# Patient Record
Sex: FEMALE | Race: BLACK OR AFRICAN AMERICAN | NOT HISPANIC OR LATINO
[De-identification: names, ages, dates, MRNs, and addresses within clinical notes are randomized per-mention and may not be internally consistent; named-entity substitution may affect disease eponyms.]

---

## 2017-01-05 ENCOUNTER — APPOINTMENT (OUTPATIENT)
Dept: OBGYN | Facility: CLINIC | Age: 37
End: 2017-01-05

## 2017-04-17 ENCOUNTER — APPOINTMENT (OUTPATIENT)
Dept: PLASTIC SURGERY | Facility: CLINIC | Age: 37
End: 2017-04-17

## 2017-06-29 ENCOUNTER — APPOINTMENT (OUTPATIENT)
Dept: OBGYN | Facility: CLINIC | Age: 37
End: 2017-06-29

## 2017-06-29 VITALS
HEIGHT: 65 IN | DIASTOLIC BLOOD PRESSURE: 80 MMHG | HEART RATE: 89 BPM | WEIGHT: 163 LBS | BODY MASS INDEX: 27.16 KG/M2 | SYSTOLIC BLOOD PRESSURE: 120 MMHG

## 2017-07-03 ENCOUNTER — APPOINTMENT (OUTPATIENT)
Dept: PLASTIC SURGERY | Facility: CLINIC | Age: 37
End: 2017-07-03

## 2017-07-16 ENCOUNTER — EMERGENCY (EMERGENCY)
Facility: HOSPITAL | Age: 37
LOS: 1 days | Discharge: HOME | End: 2017-07-16
Attending: PERSONAL EMERGENCY RESPONSE ATTENDANT | Admitting: PERSONAL EMERGENCY RESPONSE ATTENDANT
Payer: COMMERCIAL

## 2017-07-16 VITALS
SYSTOLIC BLOOD PRESSURE: 127 MMHG | OXYGEN SATURATION: 98 % | HEART RATE: 66 BPM | DIASTOLIC BLOOD PRESSURE: 86 MMHG | TEMPERATURE: 99 F | RESPIRATION RATE: 16 BRPM

## 2017-07-16 VITALS
SYSTOLIC BLOOD PRESSURE: 136 MMHG | RESPIRATION RATE: 17 BRPM | TEMPERATURE: 98 F | OXYGEN SATURATION: 100 % | DIASTOLIC BLOOD PRESSURE: 65 MMHG | HEART RATE: 61 BPM

## 2017-07-16 LAB
ALBUMIN SERPL ELPH-MCNC: 4.2 G/DL — SIGNIFICANT CHANGE UP (ref 3.3–5)
ALP SERPL-CCNC: 73 U/L — SIGNIFICANT CHANGE UP (ref 40–120)
ALT FLD-CCNC: 15 U/L RC — SIGNIFICANT CHANGE UP (ref 10–45)
ANION GAP SERPL CALC-SCNC: 15 MMOL/L — SIGNIFICANT CHANGE UP (ref 5–17)
APPEARANCE UR: ABNORMAL
AST SERPL-CCNC: 17 U/L — SIGNIFICANT CHANGE UP (ref 10–40)
BASOPHILS # BLD AUTO: 0 K/UL — SIGNIFICANT CHANGE UP (ref 0–0.2)
BASOPHILS NFR BLD AUTO: 0.8 % — SIGNIFICANT CHANGE UP (ref 0–2)
BILIRUB SERPL-MCNC: 0.3 MG/DL — SIGNIFICANT CHANGE UP (ref 0.2–1.2)
BILIRUB UR-MCNC: NEGATIVE — SIGNIFICANT CHANGE UP
BUN SERPL-MCNC: 7 MG/DL — SIGNIFICANT CHANGE UP (ref 7–23)
CALCIUM SERPL-MCNC: 9.8 MG/DL — SIGNIFICANT CHANGE UP (ref 8.4–10.5)
CHLORIDE SERPL-SCNC: 102 MMOL/L — SIGNIFICANT CHANGE UP (ref 96–108)
CO2 SERPL-SCNC: 23 MMOL/L — SIGNIFICANT CHANGE UP (ref 22–31)
COLOR SPEC: YELLOW — SIGNIFICANT CHANGE UP
COMMENT - URINE: SIGNIFICANT CHANGE UP
CREAT SERPL-MCNC: 0.81 MG/DL — SIGNIFICANT CHANGE UP (ref 0.5–1.3)
DIFF PNL FLD: NEGATIVE — SIGNIFICANT CHANGE UP
EOSINOPHIL # BLD AUTO: 0.1 K/UL — SIGNIFICANT CHANGE UP (ref 0–0.5)
EOSINOPHIL NFR BLD AUTO: 2 % — SIGNIFICANT CHANGE UP (ref 0–6)
EPI CELLS # UR: SIGNIFICANT CHANGE UP /HPF
GLUCOSE SERPL-MCNC: 71 MG/DL — SIGNIFICANT CHANGE UP (ref 70–99)
GLUCOSE UR QL: NEGATIVE — SIGNIFICANT CHANGE UP
HCT VFR BLD CALC: 42.3 % — SIGNIFICANT CHANGE UP (ref 34.5–45)
HGB BLD-MCNC: 13.8 G/DL — SIGNIFICANT CHANGE UP (ref 11.5–15.5)
KETONES UR-MCNC: NEGATIVE — SIGNIFICANT CHANGE UP
LEUKOCYTE ESTERASE UR-ACNC: NEGATIVE — SIGNIFICANT CHANGE UP
LYMPHOCYTES # BLD AUTO: 2.2 K/UL — SIGNIFICANT CHANGE UP (ref 1–3.3)
LYMPHOCYTES # BLD AUTO: 38.5 % — SIGNIFICANT CHANGE UP (ref 13–44)
MCHC RBC-ENTMCNC: 29.2 PG — SIGNIFICANT CHANGE UP (ref 27–34)
MCHC RBC-ENTMCNC: 32.7 GM/DL — SIGNIFICANT CHANGE UP (ref 32–36)
MCV RBC AUTO: 89.5 FL — SIGNIFICANT CHANGE UP (ref 80–100)
MONOCYTES # BLD AUTO: 0.5 K/UL — SIGNIFICANT CHANGE UP (ref 0–0.9)
MONOCYTES NFR BLD AUTO: 8.6 % — SIGNIFICANT CHANGE UP (ref 2–14)
NEUTROPHILS # BLD AUTO: 2.9 K/UL — SIGNIFICANT CHANGE UP (ref 1.8–7.4)
NEUTROPHILS NFR BLD AUTO: 50 % — SIGNIFICANT CHANGE UP (ref 43–77)
NITRITE UR-MCNC: NEGATIVE — SIGNIFICANT CHANGE UP
PH UR: 7.5 — SIGNIFICANT CHANGE UP (ref 5–8)
PLATELET # BLD AUTO: 255 K/UL — SIGNIFICANT CHANGE UP (ref 150–400)
POTASSIUM SERPL-MCNC: 4.1 MMOL/L — SIGNIFICANT CHANGE UP (ref 3.5–5.3)
POTASSIUM SERPL-SCNC: 4.1 MMOL/L — SIGNIFICANT CHANGE UP (ref 3.5–5.3)
PROT SERPL-MCNC: 9.2 G/DL — HIGH (ref 6–8.3)
PROT UR-MCNC: SIGNIFICANT CHANGE UP
RBC # BLD: 4.72 M/UL — SIGNIFICANT CHANGE UP (ref 3.8–5.2)
RBC # FLD: 12.3 % — SIGNIFICANT CHANGE UP (ref 10.3–14.5)
SODIUM SERPL-SCNC: 140 MMOL/L — SIGNIFICANT CHANGE UP (ref 135–145)
SP GR SPEC: 1.02 — SIGNIFICANT CHANGE UP (ref 1.01–1.02)
TSH SERPL-MCNC: 1.08 UIU/ML — SIGNIFICANT CHANGE UP (ref 0.27–4.2)
UROBILINOGEN FLD QL: NEGATIVE — SIGNIFICANT CHANGE UP
WBC # BLD: 5.8 K/UL — SIGNIFICANT CHANGE UP (ref 3.8–10.5)
WBC # FLD AUTO: 5.8 K/UL — SIGNIFICANT CHANGE UP (ref 3.8–10.5)

## 2017-07-16 PROCEDURE — 84443 ASSAY THYROID STIM HORMONE: CPT

## 2017-07-16 PROCEDURE — 85027 COMPLETE CBC AUTOMATED: CPT

## 2017-07-16 PROCEDURE — 93005 ELECTROCARDIOGRAM TRACING: CPT

## 2017-07-16 PROCEDURE — 80053 COMPREHEN METABOLIC PANEL: CPT

## 2017-07-16 PROCEDURE — 87086 URINE CULTURE/COLONY COUNT: CPT

## 2017-07-16 PROCEDURE — 99284 EMERGENCY DEPT VISIT MOD MDM: CPT | Mod: 25

## 2017-07-16 PROCEDURE — 93010 ELECTROCARDIOGRAM REPORT: CPT

## 2017-07-16 PROCEDURE — 81001 URINALYSIS AUTO W/SCOPE: CPT

## 2017-07-16 RX ORDER — SODIUM CHLORIDE 9 MG/ML
1000 INJECTION INTRAMUSCULAR; INTRAVENOUS; SUBCUTANEOUS ONCE
Qty: 0 | Refills: 0 | Status: COMPLETED | OUTPATIENT
Start: 2017-07-16 | End: 2017-07-16

## 2017-07-16 RX ORDER — DIPHENHYDRAMINE HCL 50 MG
25 CAPSULE ORAL EVERY 4 HOURS
Qty: 0 | Refills: 0 | Status: DISCONTINUED | OUTPATIENT
Start: 2017-07-16 | End: 2017-07-20

## 2017-07-16 RX ADMIN — Medication 60 MILLIGRAM(S): at 13:55

## 2017-07-16 RX ADMIN — Medication 25 MILLIGRAM(S): at 11:57

## 2017-07-16 RX ADMIN — SODIUM CHLORIDE 1000 MILLILITER(S): 9 INJECTION INTRAMUSCULAR; INTRAVENOUS; SUBCUTANEOUS at 11:23

## 2017-07-16 NOTE — ED PROVIDER NOTE - PROGRESS NOTE DETAILS
[employer]:  Pt feels improved s/p 1L NS bolus.  Labs and urine non-actionable.  No focal neuro deficits.  Pt requests tx for pruritic rash.  Rash is non-specific.  No focal findings for rash.  NO excoriations/erythema concerning for superinfection.  Advised she will be rxed short course of steroids for tx of unclear etiology of rash.  Referred to rheumatology for further assessment.  PT advised that she should follow-up with rheumatology and return to ED for any acutely new/worsening sxs.  Return to ED immediately for CP/acute SOB/syncope or pre-syncope. [employer]:  Pt advised of EKG abnormality.  No syncopal events.  No arrythmia.  Does not meet brugada criteria and pt ahs no CP.  Referred to cardiology for further eval.  TSH has resulted and is wnl.  Referred to PCP/cards/rheum.  Short course steroids.  Given copy of EKG. Stable for discharge.

## 2017-07-16 NOTE — ED ADULT NURSE NOTE - OBJECTIVE STATEMENT
38 yo  F presents to ED A+Ox3 c/o allergic reaction. States she saw her PMD approx. 3 weeks ago after feeling dizzy. States she was instructed by her PMD to begin losing weight, pt. started drinking apple cider vinegar 3 weeks ago every morning. Reports after three days of drinking the apple cider, she began to notice a "rash." States she stopped using the apple cider on Friday. Small raised areas noted to abdomen, legs and arms. States she put lavender oil to the right arm "on one of the spots and it got bigger." No drainage noted to raised areas. Pt. denies SOB, difficulty breathing, throat itchiness, fever, chills, N/V, abdominal, chest pain. Lungs CTA, breathing unlabored on RA. Skin warm dry and of color appropriate for ethnicity. Abdomen soft, nondistended, nontender. Comfort and safety maintained. 36 yo  F presents to ED A+Ox3 c/o allergic reaction. States she saw her PMD approx. 3 weeks ago after feeling dizzy. States she was instructed by her PMD to begin losing weight, pt. started drinking apple cider vinegar 3 weeks ago every morning. Reports after three days of drinking the apple cider, she began to notice a "rash." States she stopped using the apple cider on Friday. Small raised areas noted to abdomen, legs and arms. States she put lavender oil to the right arm "on one of the spots and it got bigger." No drainage noted to raised areas. Pt. denies SOB, difficulty breathing, throat itchiness, fever, chills, N/V, abdominal, chest pain. Lungs CTA, breathing unlabored on RA. Skin warm dry and of color appropriate for ethnicity. Abdomen soft, nondistended, nontender. Comfort and safety maintained. Denies any recent changes in soap, detergent.

## 2017-07-16 NOTE — ED PROVIDER NOTE - OBJECTIVE STATEMENT
37 year old female presents c/o body itching x 2 weeks. Patient states that she began noticing bumps on her skin associated with pruritis 2 days after starting to drink apple cider vinegar. Patient was seen by PMD prior to starting apple cider vinegar w/ complaints of dizziness and feeling light headed, PMD stated that pt had elevated cholesterol and patient was advised to lose weight which is why she began to take the apple cider vinegar. Patient stopped taking ACV 2 days ago, she denies any new changes in food, lotions, detergents. She states that she has noticed some SHARMA for the past few weeks. She denies sensation of her throat closing, difficulty breathing, chest pain. abdominal pain, N/V, fevers.       PMD: Dr. Robert Smith

## 2017-07-17 LAB
CULTURE RESULTS: SIGNIFICANT CHANGE UP
SPECIMEN SOURCE: SIGNIFICANT CHANGE UP

## 2017-07-31 ENCOUNTER — APPOINTMENT (OUTPATIENT)
Dept: PLASTIC SURGERY | Facility: CLINIC | Age: 37
End: 2017-07-31
Payer: MEDICAID

## 2017-07-31 DIAGNOSIS — N62 HYPERTROPHY OF BREAST: ICD-10-CM

## 2017-07-31 PROCEDURE — 99213 OFFICE O/P EST LOW 20 MIN: CPT

## 2017-08-15 ENCOUNTER — TRANSCRIPTION ENCOUNTER (OUTPATIENT)
Age: 37
End: 2017-08-15

## 2018-10-18 ENCOUNTER — APPOINTMENT (OUTPATIENT)
Dept: OBGYN | Facility: CLINIC | Age: 38
End: 2018-10-18
Payer: COMMERCIAL

## 2018-10-18 VITALS
BODY MASS INDEX: 26.52 KG/M2 | DIASTOLIC BLOOD PRESSURE: 82 MMHG | SYSTOLIC BLOOD PRESSURE: 136 MMHG | HEART RATE: 69 BPM | WEIGHT: 159.2 LBS | HEIGHT: 65 IN

## 2018-10-18 PROCEDURE — 99395 PREV VISIT EST AGE 18-39: CPT

## 2018-10-31 LAB
CYTOLOGY CVX/VAG DOC THIN PREP: NORMAL
HBV SURFACE AG SER QL: NONREACTIVE
HCV AB SER QL: NONREACTIVE
HCV S/CO RATIO: 0.12 S/CO
HIV1+2 AB SPEC QL IA.RAPID: NONREACTIVE
HPV HIGH+LOW RISK DNA PNL CVX: NOT DETECTED
T PALLIDUM AB SER QL IA: NEGATIVE

## 2019-06-04 ENCOUNTER — MED ADMIN CHARGE (OUTPATIENT)
Age: 39
End: 2019-06-04

## 2019-06-04 DIAGNOSIS — Z86.19 PERSONAL HISTORY OF OTHER INFECTIOUS AND PARASITIC DISEASES: ICD-10-CM

## 2019-11-08 ENCOUNTER — APPOINTMENT (OUTPATIENT)
Dept: OBGYN | Facility: CLINIC | Age: 39
End: 2019-11-08

## 2020-01-02 ENCOUNTER — APPOINTMENT (OUTPATIENT)
Dept: OBGYN | Facility: CLINIC | Age: 40
End: 2020-01-02
Payer: COMMERCIAL

## 2020-01-02 VITALS
WEIGHT: 156.44 LBS | BODY MASS INDEX: 26.06 KG/M2 | HEART RATE: 71 BPM | SYSTOLIC BLOOD PRESSURE: 134 MMHG | DIASTOLIC BLOOD PRESSURE: 77 MMHG | HEIGHT: 65 IN

## 2020-01-02 PROCEDURE — 99395 PREV VISIT EST AGE 18-39: CPT

## 2020-01-04 LAB — HPV HIGH+LOW RISK DNA PNL CVX: NOT DETECTED

## 2020-01-04 NOTE — HISTORY OF PRESENT ILLNESS
[Good] : being in good health [Last Pap ___] : Last cervical pap smear was [unfilled] [Regular Exercise] : not exercising regularly [Regular Cycle Intervals] : periods have been regular [Contraception] : does not use contraception [Sexually Active] : is not sexually active

## 2020-01-04 NOTE — COUNSELING
[Breast Self Exam] : breast self exam [Vitamins/Supplements] : vitamins/supplements [Exercise] : exercise [Nutrition] : nutrition [Medication Management] : medication management

## 2020-01-04 NOTE — PHYSICAL EXAM
[Awake] : awake [LAD] : no lymphadenopathy [Acute Distress] : no acute distress [Alert] : alert [Goiter] : no goiter [Thyroid Nodule] : no thyroid nodule [Mass] : no breast mass [Nipple Discharge] : no nipple discharge [Axillary LAD] : no axillary lymphadenopathy [Tender] : non tender [Oriented x3] : oriented to person, place, and time [Soft] : soft [No Bleeding] : there was no active vaginal bleeding [Uterine Adnexae] : were not tender and not enlarged [Normal] : uterus

## 2020-01-08 LAB — CYTOLOGY CVX/VAG DOC THIN PREP: ABNORMAL

## 2020-01-28 DIAGNOSIS — N92.0 EXCESSIVE AND FREQUENT MENSTRUATION WITH REGULAR CYCLE: ICD-10-CM

## 2020-01-30 ENCOUNTER — OTHER (OUTPATIENT)
Age: 40
End: 2020-01-30

## 2020-02-12 ENCOUNTER — ASOB RESULT (OUTPATIENT)
Age: 40
End: 2020-02-12

## 2020-02-12 ENCOUNTER — APPOINTMENT (OUTPATIENT)
Dept: OBGYN | Facility: CLINIC | Age: 40
End: 2020-02-12
Payer: COMMERCIAL

## 2020-02-12 DIAGNOSIS — N84.0 POLYP OF CORPUS UTERI: ICD-10-CM

## 2020-02-12 DIAGNOSIS — D25.0 SUBMUCOUS LEIOMYOMA OF UTERUS: ICD-10-CM

## 2020-02-12 LAB — HCG UR QL: NEGATIVE

## 2020-02-12 PROCEDURE — 76831 ECHO EXAM UTERUS: CPT

## 2020-02-12 PROCEDURE — ZZZZZ: CPT

## 2020-02-12 PROCEDURE — 58340 CATHETER FOR HYSTEROGRAPHY: CPT

## 2020-03-17 PROBLEM — D25.0 SUBMUCOUS LEIOMYOMA OF UTERUS: Status: ACTIVE | Noted: 2020-03-17

## 2020-03-17 PROBLEM — N84.0 ENDOMETRIAL POLYP: Status: ACTIVE | Noted: 2020-03-17

## 2020-03-17 NOTE — PROCEDURE
[Abnormal Uterine Bleeding] : abnormal uterine bleeding [FreeTextEntry3] : Saline sonogram perfomed\par SM myoma and intracavitary polypoid lesion seen.\par See offical sono report

## 2020-03-19 DIAGNOSIS — N76.0 ACUTE VAGINITIS: ICD-10-CM

## 2020-03-19 DIAGNOSIS — B96.89 ACUTE VAGINITIS: ICD-10-CM

## 2020-05-08 ENCOUNTER — APPOINTMENT (OUTPATIENT)
Dept: OBGYN | Facility: CLINIC | Age: 40
End: 2020-05-08

## 2020-07-16 ENCOUNTER — OUTPATIENT (OUTPATIENT)
Dept: OUTPATIENT SERVICES | Facility: HOSPITAL | Age: 40
LOS: 1 days | End: 2020-07-16

## 2020-07-16 VITALS
RESPIRATION RATE: 16 BRPM | HEIGHT: 65 IN | HEART RATE: 56 BPM | SYSTOLIC BLOOD PRESSURE: 122 MMHG | WEIGHT: 153 LBS | DIASTOLIC BLOOD PRESSURE: 78 MMHG | TEMPERATURE: 97 F | OXYGEN SATURATION: 95 %

## 2020-07-16 DIAGNOSIS — N84.1 POLYP OF CERVIX UTERI: ICD-10-CM

## 2020-07-16 LAB
HCG UR-SCNC: NEGATIVE — SIGNIFICANT CHANGE UP
HCT VFR BLD CALC: 40.7 % — SIGNIFICANT CHANGE UP (ref 34.5–45)
HGB BLD-MCNC: 13.3 G/DL — SIGNIFICANT CHANGE UP (ref 11.5–15.5)
MCHC RBC-ENTMCNC: 29.2 PG — SIGNIFICANT CHANGE UP (ref 27–34)
MCHC RBC-ENTMCNC: 32.7 % — SIGNIFICANT CHANGE UP (ref 32–36)
MCV RBC AUTO: 89.5 FL — SIGNIFICANT CHANGE UP (ref 80–100)
NRBC # FLD: 0 K/UL — SIGNIFICANT CHANGE UP (ref 0–0)
PLATELET # BLD AUTO: 273 K/UL — SIGNIFICANT CHANGE UP (ref 150–400)
PMV BLD: 11.9 FL — SIGNIFICANT CHANGE UP (ref 7–13)
RBC # BLD: 4.55 M/UL — SIGNIFICANT CHANGE UP (ref 3.8–5.2)
RBC # FLD: 13.1 % — SIGNIFICANT CHANGE UP (ref 10.3–14.5)
SP GR UR: 1.02 — SIGNIFICANT CHANGE UP (ref 1–1.04)
WBC # BLD: 5.34 K/UL — SIGNIFICANT CHANGE UP (ref 3.8–10.5)
WBC # FLD AUTO: 5.34 K/UL — SIGNIFICANT CHANGE UP (ref 3.8–10.5)

## 2020-07-16 NOTE — H&P PST ADULT - NEGATIVE ENMT SYMPTOMS
no dysphagia/no throat pain/no tinnitus/no vertigo/no hearing difficulty/no sinus symptoms/no nose bleeds/no ear pain

## 2020-07-16 NOTE — H&P PST ADULT - NSICDXPASTMEDICALHX_GEN_ALL_CORE_FT
PAST MEDICAL HISTORY:  Excessive and frequent menstruation with regular cycle     No pertinent past medical history     Polyp of cervix uteri PAST MEDICAL HISTORY:  Excessive and frequent menstruation with regular cycle     Polyp of cervix uteri

## 2020-07-16 NOTE — H&P PST ADULT - ATTENDING COMMENTS
Pt with AUB presents for D&C hysteroscopy and resectoscope for uterine polyp. R/S/B of procedure d/w pt including but not limited to infection, bleeding, injury to uterus and nearby organs.  Pt also gave consent for EUA by learners.

## 2020-07-16 NOTE — H&P PST ADULT - ASSESSMENT
polyp of cervix uteri  excessive and frequent menstruation with regular cycle Problem: polyp of cervix uteri, excessive and frequent menstruation with regular cycle  Assessment and Plan: Pt is tentatively scheduled for scheduled for dilation curettage hysteroscopy with symphion for 7/24/20. Pre-op instructions provided. Pt given verbal and written instructions with teach back on pepcid. Urine cup provided for day of procedure pregnancy test. Pt verbalized understanding with return demonstration.     Pt states she is scheduled for COVID-19 test on 7/22

## 2020-07-16 NOTE — H&P PST ADULT - NEGATIVE CARDIOVASCULAR SYMPTOMS
no paroxysmal nocturnal dyspnea/no palpitations/no peripheral edema/no dyspnea on exertion/no chest pain

## 2020-07-16 NOTE — H&P PST ADULT - NEGATIVE OPHTHALMOLOGIC SYMPTOMS
no pain R/no loss of vision L/no diplopia/no photophobia/no blurred vision L/no loss of vision R/no blurred vision R/no pain L

## 2020-07-16 NOTE — H&P PST ADULT - HISTORY OF PRESENT ILLNESS
polyp of cervix uteri  excessive and frequent menstruation with regular cycle 40 year old female presents to presurgical testing with diagnosis of polyp of cervix uteri and excessive and frequent menstruation with regular cycle scheduled for dilation curettage hysteroscopy with symphion. Pt complaining of changes in menses over last several months, associated with menorrhagia, dysmenorrhea, spotting between menses, and frequent menses.

## 2020-07-16 NOTE — H&P PST ADULT - NEGATIVE NEUROLOGICAL SYMPTOMS
no paresthesias/no generalized seizures/no focal seizures/no syncope/no headache/no transient paralysis/no tremors/no weakness/no difficulty walking

## 2020-07-16 NOTE — H&P PST ADULT - NSICDXPASTSURGICALHX_GEN_ALL_CORE_FT
PAST SURGICAL HISTORY:  No significant past surgical history PAST SURGICAL HISTORY:  S/P bilateral breast reduction 2/2020

## 2020-07-16 NOTE — H&P PST ADULT - NSANTHOSAYNRD_GEN_A_CORE
No. SHARMAINE screening performed.  STOP BANG Legend: 0-2 = LOW Risk; 3-4 = INTERMEDIATE Risk; 5-8 = HIGH Risk

## 2020-07-21 ENCOUNTER — APPOINTMENT (OUTPATIENT)
Dept: OBGYN | Facility: CLINIC | Age: 40
End: 2020-07-21

## 2020-07-21 LAB — SARS-COV-2 N GENE NPH QL NAA+PROBE: NOT DETECTED

## 2020-07-23 ENCOUNTER — TRANSCRIPTION ENCOUNTER (OUTPATIENT)
Age: 40
End: 2020-07-23

## 2020-07-24 ENCOUNTER — RESULT REVIEW (OUTPATIENT)
Age: 40
End: 2020-07-24

## 2020-07-24 ENCOUNTER — APPOINTMENT (OUTPATIENT)
Dept: OBGYN | Facility: HOSPITAL | Age: 40
End: 2020-07-24

## 2020-07-24 ENCOUNTER — OUTPATIENT (OUTPATIENT)
Dept: OUTPATIENT SERVICES | Facility: HOSPITAL | Age: 40
LOS: 1 days | Discharge: HOME | End: 2020-07-24
Payer: COMMERCIAL

## 2020-07-24 VITALS
DIASTOLIC BLOOD PRESSURE: 88 MMHG | TEMPERATURE: 98 F | HEIGHT: 65 IN | RESPIRATION RATE: 18 BRPM | HEART RATE: 61 BPM | SYSTOLIC BLOOD PRESSURE: 142 MMHG | WEIGHT: 153 LBS | OXYGEN SATURATION: 99 %

## 2020-07-24 VITALS
HEART RATE: 60 BPM | SYSTOLIC BLOOD PRESSURE: 129 MMHG | OXYGEN SATURATION: 98 % | DIASTOLIC BLOOD PRESSURE: 82 MMHG | RESPIRATION RATE: 18 BRPM

## 2020-07-24 DIAGNOSIS — D25.9 LEIOMYOMA OF UTERUS, UNSPECIFIED: ICD-10-CM

## 2020-07-24 DIAGNOSIS — Z98.890 OTHER SPECIFIED POSTPROCEDURAL STATES: Chronic | ICD-10-CM

## 2020-07-24 DIAGNOSIS — N84.1 POLYP OF CERVIX UTERI: ICD-10-CM

## 2020-07-24 DIAGNOSIS — N84.0 POLYP OF CORPUS UTERI: ICD-10-CM

## 2020-07-24 PROBLEM — N92.0 EXCESSIVE AND FREQUENT MENSTRUATION WITH REGULAR CYCLE: Chronic | Status: ACTIVE | Noted: 2020-07-16

## 2020-07-24 LAB — HCG UR QL: NEGATIVE — SIGNIFICANT CHANGE UP

## 2020-07-24 PROCEDURE — 58561 HYSTEROSCOPY REMOVE MYOMA: CPT | Mod: GC

## 2020-07-24 PROCEDURE — 88305 TISSUE EXAM BY PATHOLOGIST: CPT | Mod: 26

## 2020-07-24 RX ORDER — ONDANSETRON 8 MG/1
4 TABLET, FILM COATED ORAL ONCE
Refills: 0 | Status: DISCONTINUED | OUTPATIENT
Start: 2020-07-24 | End: 2020-08-08

## 2020-07-24 RX ORDER — FENTANYL CITRATE 50 UG/ML
50 INJECTION INTRAVENOUS
Refills: 0 | Status: DISCONTINUED | OUTPATIENT
Start: 2020-07-24 | End: 2020-07-24

## 2020-07-24 RX ORDER — METOCLOPRAMIDE HCL 10 MG
10 TABLET ORAL ONCE
Refills: 0 | Status: DISCONTINUED | OUTPATIENT
Start: 2020-07-24 | End: 2020-08-08

## 2020-07-24 RX ORDER — SODIUM CHLORIDE 9 MG/ML
1000 INJECTION, SOLUTION INTRAVENOUS ONCE
Refills: 0 | Status: DISCONTINUED | OUTPATIENT
Start: 2020-07-24 | End: 2020-08-08

## 2020-07-24 RX ORDER — FENTANYL CITRATE 50 UG/ML
25 INJECTION INTRAVENOUS
Refills: 0 | Status: DISCONTINUED | OUTPATIENT
Start: 2020-07-24 | End: 2020-07-24

## 2020-07-24 RX ADMIN — FENTANYL CITRATE 25 MICROGRAM(S): 50 INJECTION INTRAVENOUS at 19:34

## 2020-07-24 RX ADMIN — FENTANYL CITRATE 25 MICROGRAM(S): 50 INJECTION INTRAVENOUS at 18:26

## 2020-07-24 RX ADMIN — FENTANYL CITRATE 50 MICROGRAM(S): 50 INJECTION INTRAVENOUS at 18:56

## 2020-07-24 RX ADMIN — FENTANYL CITRATE 25 MICROGRAM(S): 50 INJECTION INTRAVENOUS at 18:56

## 2020-07-24 RX ADMIN — FENTANYL CITRATE 50 MICROGRAM(S): 50 INJECTION INTRAVENOUS at 19:07

## 2020-07-24 RX ADMIN — FENTANYL CITRATE 25 MICROGRAM(S): 50 INJECTION INTRAVENOUS at 19:24

## 2020-07-24 NOTE — BRIEF OPERATIVE NOTE - NSICDXBRIEFPROCEDURE_GEN_ALL_CORE_FT
PROCEDURES:  Operative hysteroscopy with fluid management system 24-Jul-2020 16:45:09  Moon Daniel  Dilation and curettage, uterus 24-Jul-2020 16:44:56  Moon Daniel

## 2020-07-24 NOTE — BRIEF OPERATIVE NOTE - NSICDXBRIEFPREOP_GEN_ALL_CORE_FT
PRE-OP DIAGNOSIS:  Uterine polyp 24-Jul-2020 16:45:37  Moon Daniel  Uterine fibroid 24-Jul-2020 16:45:31  Moon Daniel

## 2020-07-24 NOTE — ASU DISCHARGE PLAN (ADULT/PEDIATRIC) - NURSING INSTRUCTIONS
DO NOT take any Tylenol (Acetaminophen) or narcotics containing Tylenol until after  10.15pm and no Toradol, Advil, Motrin until after 10pm . You received Tylenol  and Toradol during your operation and it can cause damage to your liver if too much is taken within a 24 hour time period.

## 2020-07-24 NOTE — ASU DISCHARGE PLAN (ADULT/PEDIATRIC) - CALL YOUR DOCTOR IF YOU HAVE ANY OF THE FOLLOWING:
Bleeding that does not stop/Fever greater than (need to indicate Fahrenheit or Celsius)/Unable to urinate/Nausea and vomiting that does not stop

## 2020-07-24 NOTE — BRIEF OPERATIVE NOTE - OPERATION/FINDINGS
Anteverted uterus, approximately 8 weeks in size. No adnexal masses palpated bilaterally. Upon entry to the uterine cavity, a anterior pedunculated polyp and left uterine side wall myoma were visualized. Otherwise the cavity was wnl.

## 2020-07-24 NOTE — ASU DISCHARGE PLAN (ADULT/PEDIATRIC) - CARE PROVIDER_API CALL
Garima Medina  [facility] G[facility]  [id number] [address] [place] [address]  Phone: [phone]  Fax: [phone]  Follow Up Time:

## 2020-07-24 NOTE — ASU DISCHARGE PLAN (ADULT/PEDIATRIC) - ASU DC SPECIAL INSTRUCTIONSFT
Postoperative Instructions        Pain control      For pain control, take the followin. Motrin 600mg four times a day, take with food  2. Add Tylenol 975 four times a day, alternated with motrin    Motrin and Tylenol can be obtained over the counter.          Postoperative restrictions    Do not drive or make important decisions for 24 hours after anesthesia. Nothing in the vagina (tampons, sexual intercourse), No tub baths, pools or hot tubs for 2 weeks (showers are ok!). No lifting anything heavier than 15 lbs, no strenuous exercise for 2 weeks after surgery.        Vaginal bleeding    Spotting and intermittent passage of blood clots per vagina is normal in first few weeks after surgery. If you are soaking 1 pad per hour, that is not normal and you should notify Dr. Medina's office and seek medical attention right away.        Signs of Infection  Call the office and/or come to the emergency room for any of the following: foul smelling vaginal discharge, fever over 100.4F, abdominal pain or cramping that does not get better with over the counter medications, nausea/vomiting (especially if you become unable to tolerate oral intake), inability to urinate. Any of these could be signs that you may be developing an infection requiring antibiotics.      Follow Up  Call Dr. Medina's office to schedule a postoperative appointment in 2 weeks.

## 2020-07-24 NOTE — BRIEF OPERATIVE NOTE - NSICDXBRIEFPOSTOP_GEN_ALL_CORE_FT
POST-OP DIAGNOSIS:  Uterine fibroid 24-Jul-2020 16:45:49  Moon Daniel  Uterine polyp 24-Jul-2020 16:45:43  Moon Daniel

## 2020-07-24 NOTE — CHART NOTE - NSCHARTNOTEFT_GEN_A_CORE
R4 GYN Postop Check Note    Called by RN for pt w/ left sided back pain.  Pt seen and evaluated at bedside.  Pt states when she awoke in the pacu she started to have pain in her left mid back that is dull in nature, was 7/10 and improved to 5/10 with pain meds and hot packs.  The pain is nonradiating and is not associated with any abdominal pain, shoulder pain, cp, sob.  She has scant VB since surgery.  She says she felt dizzy at first on standing but that improved and she was able to walk normally.  She was able to void spontaneously and is tolerating water w/o N/V.  She reports a similar episode of back pain that occurred after her CS in 2012 and which spontaneously resolved.      ICU Vital Signs Last 24 Hrs  T(C): 36.2 (24 Jul 2020 20:00), Max: 36.8 (24 Jul 2020 13:58)  T(F): 97.2 (24 Jul 2020 20:00), Max: 98.2 (24 Jul 2020 13:58)  HR: 58 (24 Jul 2020 21:15) (47 - 78)  BP: 129/88 (24 Jul 2020 21:15) (101/66 - 146/89)  BP(mean): 97 (24 Jul 2020 21:15) (73 - 108)  RR: 15 (24 Jul 2020 21:15) (9 - 22)  SpO2: 99% (24 Jul 2020 21:15) (95% - 100%)    nad, a&ox3  rrr  ctab  back nontender to palpation w/ no erythema/edema/warmth, no cvat   abd soft, nt, nd, no peritoneal signs  scant spotting on pad  ext wwp, nt    A/P: 40y F now POD0 s/p operative hysteroscopy w/ myosure, d&c for fibroids/polyps (EBL 5cc).  Per discussion w/ primary attending Dr. Medina, extremely low suspicion for perforation.  Exam wnl and vss.  Likely musculoskeletal back pain.   -pt stable for d/c to home  -pt may take tylenol/motrin for pain, see d/c med rec  -pt advised to call Dr. Medina if pain worsens, if she is unable to walk, if she experiences dizziness/sob/cp/syncope, heavy vb, or for any other concerns  -pt to f/u as scheduled w/ Dr. Medina for postop checkup    d/w Dr. Carol Moran MD PGY4

## 2020-07-29 LAB — SURGICAL PATHOLOGY STUDY: SIGNIFICANT CHANGE UP

## 2020-10-07 ENCOUNTER — APPOINTMENT (OUTPATIENT)
Dept: OBGYN | Facility: CLINIC | Age: 40
End: 2020-10-07

## 2020-12-21 PROBLEM — Z86.19 HISTORY OF CANDIDAL VULVOVAGINITIS: Status: RESOLVED | Noted: 2019-06-04 | Resolved: 2020-12-21

## 2020-12-22 ENCOUNTER — APPOINTMENT (OUTPATIENT)
Dept: OBGYN | Facility: CLINIC | Age: 40
End: 2020-12-22

## 2020-12-23 PROBLEM — N76.0 BACTERIAL VAGINOSIS: Status: RESOLVED | Noted: 2020-01-28 | Resolved: 2020-12-23

## 2021-03-01 NOTE — ED PROVIDER NOTE - RESPIRATORY [-], MLM
Pharmacy requesting a refill of the below medication which has been pended for you:     Requested Prescriptions     Pending Prescriptions Disp Refills    ALPRAZolam (XANAX) 1 MG tablet [Pharmacy Med Name: ALPRAZOLAM 1 MG TABLET] 90 tablet      Sig: take 1 tablet by mouth three times a day if needed       Last Appointment Date: 1/28/2021  Next Appointment Date: 3/1/2021    Allergies   Allergen Reactions    Codeine Anaphylaxis     Happened when in ER getting arm reset at age 10  States did have to be tubed    Tape Candace Panning Tape] Other (See Comments)     Paper tape causes blisters
no cough

## 2021-04-06 ENCOUNTER — APPOINTMENT (OUTPATIENT)
Dept: OBGYN | Facility: CLINIC | Age: 41
End: 2021-04-06

## 2021-07-27 ENCOUNTER — APPOINTMENT (OUTPATIENT)
Dept: OBGYN | Facility: CLINIC | Age: 41
End: 2021-07-27

## 2021-08-13 ENCOUNTER — APPOINTMENT (OUTPATIENT)
Dept: INTERNAL MEDICINE | Facility: CLINIC | Age: 41
End: 2021-08-13

## 2021-10-08 ENCOUNTER — APPOINTMENT (OUTPATIENT)
Dept: INTERNAL MEDICINE | Facility: CLINIC | Age: 41
End: 2021-10-08
Payer: COMMERCIAL

## 2021-10-08 ENCOUNTER — NON-APPOINTMENT (OUTPATIENT)
Age: 41
End: 2021-10-08

## 2021-10-08 VITALS
SYSTOLIC BLOOD PRESSURE: 116 MMHG | OXYGEN SATURATION: 98 % | RESPIRATION RATE: 18 BRPM | DIASTOLIC BLOOD PRESSURE: 82 MMHG | HEIGHT: 65 IN | WEIGHT: 158 LBS | BODY MASS INDEX: 26.33 KG/M2 | TEMPERATURE: 97.7 F | HEART RATE: 64 BPM

## 2021-10-08 DIAGNOSIS — Z23 ENCOUNTER FOR IMMUNIZATION: ICD-10-CM

## 2021-10-08 PROCEDURE — G0444 DEPRESSION SCREEN ANNUAL: CPT | Mod: 59

## 2021-10-08 PROCEDURE — G0442 ANNUAL ALCOHOL SCREEN 15 MIN: CPT

## 2021-10-08 PROCEDURE — 99396 PREV VISIT EST AGE 40-64: CPT | Mod: 25

## 2021-10-08 PROCEDURE — 36415 COLL VENOUS BLD VENIPUNCTURE: CPT

## 2021-10-08 PROCEDURE — 93000 ELECTROCARDIOGRAM COMPLETE: CPT | Mod: 59

## 2021-10-08 RX ORDER — CALCIUM ASCORBATE DIHYDRATE, CHOLECALCIFEROL, DL-A TOCOPHERYL ACETATE, RIBOFLAVIN, NIACINAMIDE, FOLATE, BIOTIN, CALCIUM, FERROUS ASPARTO GLYCINATE, POTASSIUM IODIDE, MAGNESIUM OXIDE, ZINC BISGLYCINATE CHELATE, ALPHA LINOLEIC ACID, DHA, EPA 25; 1000; 15; 1.5; 10; 50; 1; 1; 100; 150; 50; 15; 50; 42 MG/1; [IU]/1; [IU]/1; MG/1; MG/1; MG/1; MG/1; MG/1; MG/1; UG/1; MG/1; MG/1; MG/1; MG/1
30-1-470 PILL ORAL
Qty: 30 | Refills: 5 | Status: DISCONTINUED | COMMUNITY
Start: 2018-10-18 | End: 2021-10-08

## 2021-10-08 RX ORDER — METRONIDAZOLE 7.5 MG/G
0.75 GEL VAGINAL
Qty: 1 | Refills: 0 | Status: DISCONTINUED | COMMUNITY
Start: 2020-01-28 | End: 2021-10-08

## 2021-10-08 RX ORDER — FLUCONAZOLE 150 MG/1
150 TABLET ORAL
Qty: 1 | Refills: 0 | Status: DISCONTINUED | COMMUNITY
Start: 2019-06-04 | End: 2021-10-08

## 2021-10-08 RX ORDER — METRONIDAZOLE 500 MG/1
500 TABLET ORAL TWICE DAILY
Qty: 10 | Refills: 0 | Status: DISCONTINUED | COMMUNITY
Start: 2020-03-19 | End: 2021-10-08

## 2021-10-08 RX ORDER — TRANEXAMIC ACID 650 MG/1
650 TABLET ORAL 3 TIMES DAILY
Qty: 30 | Refills: 3 | Status: DISCONTINUED | COMMUNITY
Start: 2020-01-02 | End: 2021-10-08

## 2021-10-10 PROBLEM — Z23 ENCOUNTER FOR IMMUNIZATION: Status: ACTIVE | Noted: 2021-10-10

## 2021-10-10 RX ORDER — AMOXICILLIN 875 MG/1
875 TABLET, FILM COATED ORAL
Qty: 20 | Refills: 0 | Status: DISCONTINUED | COMMUNITY
Start: 2021-08-21

## 2021-10-10 RX ORDER — MOMETASONE FUROATE 1 MG/G
0.1 OINTMENT TOPICAL
Qty: 15 | Refills: 0 | Status: DISCONTINUED | COMMUNITY
Start: 2021-08-30

## 2021-10-10 RX ORDER — NEOMYCIN AND POLYMYXIN B SULFATES AND HYDROCORTISONE OTIC 10; 3.5; 1 MG/ML; MG/ML; [USP'U]/ML
3.5-10000-1 SUSPENSION AURICULAR (OTIC)
Qty: 10 | Refills: 0 | Status: DISCONTINUED | COMMUNITY
Start: 2021-08-20

## 2021-10-10 NOTE — HISTORY OF PRESENT ILLNESS
[FreeTextEntry1] : Physical  [de-identified] :  Doing well. Appetitie, sleep,bms,voiding, mood all ok. \par Chart reviewed.\par

## 2021-10-10 NOTE — PHYSICAL EXAM
[No Acute Distress] : no acute distress [Well Nourished] : well nourished [Well Developed] : well developed [Well-Appearing] : well-appearing [Normal Sclera/Conjunctiva] : normal sclera/conjunctiva [PERRL] : pupils equal round and reactive to light [EOMI] : extraocular movements intact [Normal Outer Ear/Nose] : the outer ears and nose were normal in appearance [Normal Oropharynx] : the oropharynx was normal [No JVD] : no jugular venous distention [No Lymphadenopathy] : no lymphadenopathy [Supple] : supple [Thyroid Normal, No Nodules] : the thyroid was normal and there were no nodules present [No Respiratory Distress] : no respiratory distress  [No Accessory Muscle Use] : no accessory muscle use [Clear to Auscultation] : lungs were clear to auscultation bilaterally [Normal Rate] : normal rate  [Regular Rhythm] : with a regular rhythm [Normal S1, S2] : normal S1 and S2 [No Murmur] : no murmur heard [No Carotid Bruits] : no carotid bruits [No Abdominal Bruit] : a ~M bruit was not heard ~T in the abdomen [No Varicosities] : no varicosities [Pedal Pulses Present] : the pedal pulses are present [No Edema] : there was no peripheral edema [No Palpable Aorta] : no palpable aorta [No Extremity Clubbing/Cyanosis] : no extremity clubbing/cyanosis [Declined Breast Exam] : declined breast exam  [Soft] : abdomen soft [Non Tender] : non-tender [Non-distended] : non-distended [No Masses] : no abdominal mass palpated [No HSM] : no HSM [Normal Bowel Sounds] : normal bowel sounds [Normal Posterior Cervical Nodes] : no posterior cervical lymphadenopathy [Normal Anterior Cervical Nodes] : no anterior cervical lymphadenopathy [No CVA Tenderness] : no CVA  tenderness [No Joint Swelling] : no joint swelling [No Spinal Tenderness] : no spinal tenderness [Grossly Normal Strength/Tone] : grossly normal strength/tone [No Rash] : no rash [Coordination Grossly Intact] : coordination grossly intact [No Focal Deficits] : no focal deficits [Normal Gait] : normal gait [Deep Tendon Reflexes (DTR)] : deep tendon reflexes were 2+ and symmetric [Normal Affect] : the affect was normal [Normal Insight/Judgement] : insight and judgment were intact

## 2021-10-10 NOTE — HEALTH RISK ASSESSMENT
[No] : No [Patient reported mammogram was normal] : Patient reported mammogram was normal [Patient reported PAP Smear was normal] : Patient reported PAP Smear was normal [Good] : ~his/her~  mood as  good [] : No [0] : 2) Feeling down, depressed, or hopeless: Not at all (0) [PHQ-2 Negative - No further assessment needed] : PHQ-2 Negative - No further assessment needed [Audit-CScore] : 0 [[id number]] : 0 [Fully functional (bathing, dressing, toileting, transferring, walking, feeding)] : Fully functional (bathing, dressing, toileting, transferring, walking, feeding) [Fully functional (using the telephone, shopping, preparing meals, housekeeping, doing laundry, using] : Fully functional and needs no help or supervision to perform IADLs (using the telephone, shopping, preparing meals, housekeeping, doing laundry, using transportation, managing medications and managing finances) [Reports changes in hearing] : Reports no changes in hearing [Reports changes in vision] : Reports no changes in vision [MammogramDate] : 2019 [PapSmearDate] : 2020

## 2021-10-11 ENCOUNTER — NON-APPOINTMENT (OUTPATIENT)
Age: 41
End: 2021-10-11

## 2021-10-11 LAB
25(OH)D3 SERPL-MCNC: 24.7 NG/ML
ALBUMIN SERPL ELPH-MCNC: 4.1 G/DL
ALP BLD-CCNC: 78 U/L
ALT SERPL-CCNC: 16 U/L
ANION GAP SERPL CALC-SCNC: 9 MMOL/L
APPEARANCE: CLEAR
AST SERPL-CCNC: 15 U/L
BASOPHILS # BLD AUTO: 0.02 K/UL
BASOPHILS NFR BLD AUTO: 0.4 %
BILIRUB SERPL-MCNC: 0.2 MG/DL
BILIRUBIN URINE: NEGATIVE
BLOOD URINE: NEGATIVE
BUN SERPL-MCNC: 13 MG/DL
CALCIUM SERPL-MCNC: 9.7 MG/DL
CHLORIDE SERPL-SCNC: 102 MMOL/L
CHOLEST SERPL-MCNC: 205 MG/DL
CO2 SERPL-SCNC: 25 MMOL/L
COLOR: NORMAL
CREAT SERPL-MCNC: 0.76 MG/DL
EOSINOPHIL # BLD AUTO: 0.06 K/UL
EOSINOPHIL NFR BLD AUTO: 1.1 %
ESTIMATED AVERAGE GLUCOSE: 97 MG/DL
GLUCOSE QUALITATIVE U: NEGATIVE
GLUCOSE SERPL-MCNC: 106 MG/DL
HBA1C MFR BLD HPLC: 5 %
HCT VFR BLD CALC: 39.5 %
HDLC SERPL-MCNC: 68 MG/DL
HGB BLD-MCNC: 12.2 G/DL
IMM GRANULOCYTES NFR BLD AUTO: 0.2 %
KETONES URINE: NEGATIVE
LDLC SERPL CALC-MCNC: 108 MG/DL
LEUKOCYTE ESTERASE URINE: NEGATIVE
LYMPHOCYTES # BLD AUTO: 1.5 K/UL
LYMPHOCYTES NFR BLD AUTO: 27.8 %
MAN DIFF?: NORMAL
MCHC RBC-ENTMCNC: 28.6 PG
MCHC RBC-ENTMCNC: 30.9 GM/DL
MCV RBC AUTO: 92.7 FL
MONOCYTES # BLD AUTO: 0.54 K/UL
MONOCYTES NFR BLD AUTO: 10 %
NEUTROPHILS # BLD AUTO: 3.26 K/UL
NEUTROPHILS NFR BLD AUTO: 60.5 %
NITRITE URINE: NEGATIVE
NONHDLC SERPL-MCNC: 137 MG/DL
PH URINE: 5
PLATELET # BLD AUTO: 259 K/UL
POTASSIUM SERPL-SCNC: 3.8 MMOL/L
PROT SERPL-MCNC: 7.3 G/DL
PROTEIN URINE: NEGATIVE
RBC # BLD: 4.26 M/UL
RBC # FLD: 13.4 %
SODIUM SERPL-SCNC: 137 MMOL/L
SPECIFIC GRAVITY URINE: 1.03
TRIGL SERPL-MCNC: 143 MG/DL
TSH SERPL-ACNC: 0.64 UIU/ML
UROBILINOGEN URINE: NORMAL
VIT B12 SERPL-MCNC: 817 PG/ML
WBC # FLD AUTO: 5.39 K/UL

## 2021-11-02 ENCOUNTER — APPOINTMENT (OUTPATIENT)
Dept: OBGYN | Facility: CLINIC | Age: 41
End: 2021-11-02

## 2022-01-09 ENCOUNTER — TRANSCRIPTION ENCOUNTER (OUTPATIENT)
Age: 42
End: 2022-01-09

## 2022-01-09 ENCOUNTER — NON-APPOINTMENT (OUTPATIENT)
Age: 42
End: 2022-01-09

## 2022-01-09 ENCOUNTER — APPOINTMENT (OUTPATIENT)
Dept: INTERNAL MEDICINE | Facility: CLINIC | Age: 42
End: 2022-01-09
Payer: COMMERCIAL

## 2022-01-09 VITALS
SYSTOLIC BLOOD PRESSURE: 132 MMHG | DIASTOLIC BLOOD PRESSURE: 88 MMHG | BODY MASS INDEX: 26.49 KG/M2 | RESPIRATION RATE: 18 BRPM | WEIGHT: 159 LBS | OXYGEN SATURATION: 98 % | HEART RATE: 103 BPM | TEMPERATURE: 98 F | HEIGHT: 65 IN

## 2022-01-09 DIAGNOSIS — Z41.1 ENCOUNTER FOR COSMETIC SURGERY: ICD-10-CM

## 2022-01-09 DIAGNOSIS — Z01.818 ENCOUNTER FOR OTHER PREPROCEDURAL EXAMINATION: ICD-10-CM

## 2022-01-09 PROCEDURE — 99214 OFFICE O/P EST MOD 30 MIN: CPT | Mod: 25

## 2022-01-09 PROCEDURE — 93000 ELECTROCARDIOGRAM COMPLETE: CPT | Mod: 59

## 2022-01-09 PROCEDURE — 36415 COLL VENOUS BLD VENIPUNCTURE: CPT

## 2022-01-09 NOTE — HISTORY OF PRESENT ILLNESS
[No Pertinent Cardiac History] : no history of aortic stenosis, atrial fibrillation, coronary artery disease, recent myocardial infarction, or implantable device/pacemaker [No Pertinent Pulmonary History] : no history of asthma, COPD, sleep apnea, or smoking [(Patient denies any chest pain, claudication, dyspnea on exertion, orthopnea, palpitations or syncope)] : Patient denies any chest pain, claudication, dyspnea on exertion, orthopnea, palpitations or syncope [Chronic Anticoagulation] : no chronic anticoagulation [Chronic Kidney Disease] : no chronic kidney disease [Diabetes] : no diabetes [FreeTextEntry1] : Liposuction [FreeTextEntry2] : 1/21/22 [FreeTextEntry3] : Dr Brianful [FreeTextEntry4] : Going for liposuction at [facility]

## 2022-01-11 LAB
ALBUMIN SERPL ELPH-MCNC: 4.3 G/DL
ALP BLD-CCNC: 76 U/L
ALT SERPL-CCNC: 20 U/L
ANION GAP SERPL CALC-SCNC: 12 MMOL/L
AST SERPL-CCNC: 18 U/L
BASOPHILS # BLD AUTO: 0.03 K/UL
BASOPHILS NFR BLD AUTO: 0.6 %
BILIRUB SERPL-MCNC: 0.3 MG/DL
BUN SERPL-MCNC: 12 MG/DL
CALCIUM SERPL-MCNC: 10 MG/DL
CHLORIDE SERPL-SCNC: 103 MMOL/L
CO2 SERPL-SCNC: 25 MMOL/L
CREAT SERPL-MCNC: 0.83 MG/DL
EOSINOPHIL # BLD AUTO: 0.03 K/UL
EOSINOPHIL NFR BLD AUTO: 0.6 %
HCG SERPL-MCNC: <1 MIU/ML
HCT VFR BLD CALC: 41.3 %
HGB BLD-MCNC: 13 G/DL
HIV1+2 AB SPEC QL IA.RAPID: NONREACTIVE
IMM GRANULOCYTES NFR BLD AUTO: 0.2 %
LYMPHOCYTES # BLD AUTO: 1.9 K/UL
LYMPHOCYTES NFR BLD AUTO: 36.3 %
MAN DIFF?: NORMAL
MCHC RBC-ENTMCNC: 28.4 PG
MCHC RBC-ENTMCNC: 31.5 GM/DL
MCV RBC AUTO: 90.2 FL
MONOCYTES # BLD AUTO: 0.33 K/UL
MONOCYTES NFR BLD AUTO: 6.3 %
NEUTROPHILS # BLD AUTO: 2.93 K/UL
NEUTROPHILS NFR BLD AUTO: 56 %
PLATELET # BLD AUTO: 194 K/UL
POTASSIUM SERPL-SCNC: 4.7 MMOL/L
PROT SERPL-MCNC: 8 G/DL
RBC # BLD: 4.58 M/UL
RBC # FLD: 13.1 %
SODIUM SERPL-SCNC: 140 MMOL/L
WBC # FLD AUTO: 5.23 K/UL

## 2022-01-12 ENCOUNTER — NON-APPOINTMENT (OUTPATIENT)
Age: 42
End: 2022-01-12

## 2022-01-14 LAB
HCV AB SER QL: NONREACTIVE
HCV S/CO RATIO: 0.14 S/CO

## 2022-02-01 ENCOUNTER — APPOINTMENT (OUTPATIENT)
Dept: INTERNAL MEDICINE | Facility: CLINIC | Age: 42
End: 2022-02-01
Payer: COMMERCIAL

## 2022-02-01 VITALS
TEMPERATURE: 97.2 F | WEIGHT: 159 LBS | HEART RATE: 78 BPM | OXYGEN SATURATION: 97 % | BODY MASS INDEX: 26.49 KG/M2 | RESPIRATION RATE: 17 BRPM | HEIGHT: 65 IN

## 2022-02-01 DIAGNOSIS — M54.12 RADICULOPATHY, CERVICAL REGION: ICD-10-CM

## 2022-02-01 DIAGNOSIS — B34.9 VIRAL INFECTION, UNSPECIFIED: ICD-10-CM

## 2022-02-01 PROCEDURE — 99214 OFFICE O/P EST MOD 30 MIN: CPT
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
## 2022-02-01 NOTE — PHYSICAL EXAM
[Normal] : no joint swelling and grossly normal strength and tone [No Focal Deficits] : no focal deficits [Alert and Oriented x3] : oriented to person, place, and time [de-identified] : rt shoulder tenderness

## 2022-02-04 ENCOUNTER — NON-APPOINTMENT (OUTPATIENT)
Age: 42
End: 2022-02-04

## 2022-02-04 LAB — SARS-COV-2 N GENE NPH QL NAA+PROBE: NOT DETECTED

## 2022-03-19 ENCOUNTER — APPOINTMENT (OUTPATIENT)
Dept: INTERNAL MEDICINE | Facility: CLINIC | Age: 42
End: 2022-03-19
Payer: COMMERCIAL

## 2022-03-19 VITALS
SYSTOLIC BLOOD PRESSURE: 120 MMHG | DIASTOLIC BLOOD PRESSURE: 86 MMHG | RESPIRATION RATE: 17 BRPM | OXYGEN SATURATION: 98 % | BODY MASS INDEX: 26.49 KG/M2 | HEART RATE: 66 BPM | HEIGHT: 65 IN | WEIGHT: 159 LBS | TEMPERATURE: 98.2 F

## 2022-03-19 PROCEDURE — 99213 OFFICE O/P EST LOW 20 MIN: CPT

## 2022-03-19 RX ORDER — METHYLPREDNISOLONE 4 MG/1
4 TABLET ORAL
Qty: 1 | Refills: 0 | Status: DISCONTINUED | COMMUNITY
Start: 2022-02-01 | End: 2022-03-19

## 2022-03-19 NOTE — HISTORY OF PRESENT ILLNESS
[FreeTextEntry1] : Left arm pain [de-identified] : Patient presents complaining of left elbow pain for 2 days moderate intermittent worse with lifting arm or bending arm.  Patient denies any trauma any numbness tingling joint swelling or rash.  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

## 2022-03-19 NOTE — ASSESSMENT
[FreeTextEntry1] : Left forearm tendinitis start nabumetone 500 mg twice daily with food.  Start elbow support avoid lifting avoid excessive repetitive motions involving left forearm/elbow.  If persist will start PT will defer to imaging for MRI.\par Follow-up 2 weeks

## 2022-03-19 NOTE — PHYSICAL EXAM
[Normal] : normal rate, regular rhythm, normal S1 and S2 and no murmur heard [de-identified] : Left forearm/elbow no deformity no effusion no focal tenderness to palpation slightly restricted pronation supination

## 2022-05-04 ENCOUNTER — APPOINTMENT (OUTPATIENT)
Dept: PHYSICAL MEDICINE AND REHAB | Facility: CLINIC | Age: 42
End: 2022-05-04
Payer: COMMERCIAL

## 2022-05-04 VITALS
DIASTOLIC BLOOD PRESSURE: 86 MMHG | WEIGHT: 162 LBS | TEMPERATURE: 98.7 F | RESPIRATION RATE: 17 BRPM | SYSTOLIC BLOOD PRESSURE: 144 MMHG | HEART RATE: 71 BPM | HEIGHT: 65 IN | OXYGEN SATURATION: 98 % | BODY MASS INDEX: 26.99 KG/M2

## 2022-05-04 DIAGNOSIS — M77.8 OTHER ENTHESOPATHIES, NOT ELSEWHERE CLASSIFIED: ICD-10-CM

## 2022-05-04 PROCEDURE — 99203 OFFICE O/P NEW LOW 30 MIN: CPT

## 2022-05-04 NOTE — HISTORY OF PRESENT ILLNESS
[FreeTextEntry1] : 42 year old female presents with left elbow pain for several months\par No fall.  She works as an [employer] \par \par Pain:  4/10 Worse: 10/10 Quality: burning  Frequency: constant\par Pain starts on the inside of her elbow without radiation.  The pain is worse with  and turning her wrist.  \par motrin 800 mg once a day with fair relief of the pain.\par He denies neck pain.

## 2022-05-19 ENCOUNTER — APPOINTMENT (OUTPATIENT)
Dept: OBGYN | Facility: CLINIC | Age: 42
End: 2022-05-19
Payer: COMMERCIAL

## 2022-05-19 VITALS
SYSTOLIC BLOOD PRESSURE: 130 MMHG | WEIGHT: 158 LBS | HEIGHT: 65 IN | HEART RATE: 49 BPM | BODY MASS INDEX: 26.33 KG/M2 | DIASTOLIC BLOOD PRESSURE: 91 MMHG

## 2022-05-19 DIAGNOSIS — Z01.419 ENCOUNTER FOR GYNECOLOGICAL EXAMINATION (GENERAL) (ROUTINE) W/OUT ABNORMAL FINDINGS: ICD-10-CM

## 2022-05-19 DIAGNOSIS — D25.9 LEIOMYOMA OF UTERUS, UNSPECIFIED: ICD-10-CM

## 2022-05-19 PROCEDURE — 99396 PREV VISIT EST AGE 40-64: CPT

## 2022-05-23 LAB — HPV HIGH+LOW RISK DNA PNL CVX: NOT DETECTED

## 2022-05-31 LAB — CYTOLOGY CVX/VAG DOC THIN PREP: NORMAL

## 2022-06-06 ENCOUNTER — APPOINTMENT (OUTPATIENT)
Dept: PHYSICAL MEDICINE AND REHAB | Facility: CLINIC | Age: 42
End: 2022-06-06

## 2022-06-22 ENCOUNTER — NON-APPOINTMENT (OUTPATIENT)
Age: 42
End: 2022-06-22

## 2022-06-22 DIAGNOSIS — R92.8 OTHER ABNORMAL AND INCONCLUSIVE FINDINGS ON DIAGNOSTIC IMAGING OF BREAST: ICD-10-CM

## 2022-09-12 ENCOUNTER — APPOINTMENT (OUTPATIENT)
Dept: INTERNAL MEDICINE | Facility: CLINIC | Age: 42
End: 2022-09-12

## 2022-09-12 ENCOUNTER — APPOINTMENT (OUTPATIENT)
Dept: PHYSICAL MEDICINE AND REHAB | Facility: CLINIC | Age: 42
End: 2022-09-12

## 2022-09-12 VITALS
TEMPERATURE: 98.1 F | OXYGEN SATURATION: 100 % | RESPIRATION RATE: 17 BRPM | WEIGHT: 163 LBS | SYSTOLIC BLOOD PRESSURE: 110 MMHG | BODY MASS INDEX: 27.16 KG/M2 | HEIGHT: 65 IN | HEART RATE: 72 BPM | DIASTOLIC BLOOD PRESSURE: 82 MMHG

## 2022-09-12 DIAGNOSIS — M77.11 LATERAL EPICONDYLITIS, RIGHT ELBOW: ICD-10-CM

## 2022-09-12 DIAGNOSIS — M25.521 PAIN IN RIGHT ELBOW: ICD-10-CM

## 2022-09-12 DIAGNOSIS — M77.12 LATERAL EPICONDYLITIS, LEFT ELBOW: ICD-10-CM

## 2022-09-12 PROCEDURE — 99214 OFFICE O/P EST MOD 30 MIN: CPT

## 2022-09-12 RX ORDER — CIPROFLOXACIN AND DEXAMETHASONE 3; 1 MG/ML; MG/ML
0.3-0.1 SUSPENSION/ DROPS AURICULAR (OTIC)
Qty: 8 | Refills: 0 | Status: DISCONTINUED | COMMUNITY
Start: 2022-05-09

## 2022-09-13 NOTE — PHYSICAL EXAM
[FreeTextEntry1] : Pleasant, in no distress. Language: English\par HEENT: Head: no trauma. Eyes: no discharge. Ears: No discharge. Nose No discharge. Throat: clear\par Neck: FAROM. Negative Spurlings\par Heart: RR, +S1, S2\par Lungs: CTA\par Abdomen: soft, NT\par Lumbar spine: FAROM, no spasm\par \par LUE: Shoulder:FAROM, MS 5/5\par Elbow: FAROM, MS 5/5 reflexes 2/4\par Wrist: FAROM, MS 5/5 reflexes 2/4\par Warm, nontender, pulse 2+\par \par RUE:Shoulder:FAROM, MS 5/5\par Elbow: FAROM, MS 4+/5 reflexes 2/4\par Wrist: FAROM, MS 5/5 reflexes 2/4\par Warm, nontender, pulse 2+\par \par LLE: Hip: FAROM, MS 5/5\par Knee: FAROM, MS 4+/5 reflexes 2/4\par Ankle: FAROM, MS 5/5 reflexes 2/4\par Warm , nontender, pulse 2+ negative homans\par \par RLE: Hip: FAROM, MS 5/5\par Knee: FAROM, MS 5/5 reflexes 2/4\par Ankle: FAROM, MS 5/5 reflexes 2/4\par Warm , nontender, pulse 2+ negative homans\par \par Gait: Spontaneous, reciprocal, safe without an assistive device\par \par Sensation\par RUE: sensation is intact to light touch, pinprick  and proprioception\par LUE: sensation is intact to light touch, pinprick  and proprioception\par RLE: sensation is intact to light touch, pinprick  and proprioception. Neg SLR. Neg NGUYEN, Neg FADIR\par LLE: sensation is intact to light touch, pinprick  and proprioception. Neg SLR. Neg NGUYEN, Neg FADIR\par \par

## 2022-09-13 NOTE — HISTORY OF PRESENT ILLNESS
[FreeTextEntry1] : 42 year old female presents with bilateral elbow pain  elbow for several months\par No fall.  She works as an [employer] .  She denies a fall.\par \par Bilateral elbow pain\par Right Pain:  10/10 Worse: 10/10 Quality: burning  Frequency: constant\par left Pain:  4/10 Worse: 10/10 Quality: burning  Frequency: constant\par Pain starts on the outside of her elbow without radiation.  The pain is worse with  and turning her wrist.  \par motrin 600 mg once a day with fair relief of the pain.\par She denies neck pain

## 2022-09-14 ENCOUNTER — APPOINTMENT (OUTPATIENT)
Dept: OBGYN | Facility: CLINIC | Age: 42
End: 2022-09-14

## 2022-09-14 VITALS
DIASTOLIC BLOOD PRESSURE: 80 MMHG | WEIGHT: 162 LBS | SYSTOLIC BLOOD PRESSURE: 126 MMHG | HEIGHT: 65 IN | BODY MASS INDEX: 26.99 KG/M2 | HEART RATE: 73 BPM

## 2022-09-14 DIAGNOSIS — N92.6 IRREGULAR MENSTRUATION, UNSPECIFIED: ICD-10-CM

## 2022-09-14 DIAGNOSIS — D17.24 BENIGN LIPOMATOUS NEOPLASM OF SKIN AND SUBCUTANEOUS TISSUE OF LEFT LEG: ICD-10-CM

## 2022-09-14 PROBLEM — M77.12 LATERAL EPICONDYLITIS OF LEFT ELBOW: Status: ACTIVE | Noted: 2022-09-12

## 2022-09-14 PROBLEM — M77.11 LATERAL EPICONDYLITIS OF RIGHT ELBOW: Status: ACTIVE | Noted: 2022-09-12

## 2022-09-14 LAB
HCG UR QL: NEGATIVE
QUALITY CONTROL: YES

## 2022-09-14 PROCEDURE — 99213 OFFICE O/P EST LOW 20 MIN: CPT

## 2022-09-14 RX ORDER — NABUMETONE 500 MG/1
500 TABLET, FILM COATED ORAL
Qty: 30 | Refills: 0 | Status: DISCONTINUED | COMMUNITY
Start: 2022-03-19 | End: 2022-09-14

## 2022-09-14 RX ORDER — IBUPROFEN 600 MG/1
600 TABLET, FILM COATED ORAL 3 TIMES DAILY
Qty: 60 | Refills: 0 | Status: DISCONTINUED | COMMUNITY
Start: 2022-05-04 | End: 2022-09-14

## 2022-09-14 NOTE — PHYSICAL EXAM
[No Acute Distress] : no acute distress [Well Nourished] : well nourished [Well Developed] : well developed [Well-Appearing] : well-appearing [Normal Sclera/Conjunctiva] : normal sclera/conjunctiva [PERRL] : pupils equal round and reactive to light [EOMI] : extraocular movements intact [Normal Outer Ear/Nose] : the outer ears and nose were normal in appearance [Normal Oropharynx] : the oropharynx was normal [No JVD] : no jugular venous distention [No Lymphadenopathy] : no lymphadenopathy [Supple] : supple [Thyroid Normal, No Nodules] : the thyroid was normal and there were no nodules present [No Respiratory Distress] : no respiratory distress  [No Accessory Muscle Use] : no accessory muscle use [Clear to Auscultation] : lungs were clear to auscultation bilaterally [Normal Rate] : normal rate  [Regular Rhythm] : with a regular rhythm [Normal S1, S2] : normal S1 and S2 [No Murmur] : no murmur heard [No Carotid Bruits] : no carotid bruits [No Abdominal Bruit] : a ~M bruit was not heard ~T in the abdomen [No Varicosities] : no varicosities [Pedal Pulses Present] : the pedal pulses are present [No Edema] : there was no peripheral edema [No Palpable Aorta] : no palpable aorta [No Extremity Clubbing/Cyanosis] : no extremity clubbing/cyanosis [Soft] : abdomen soft [Non Tender] : non-tender [Non-distended] : non-distended [No Masses] : no abdominal mass palpated [No HSM] : no HSM [Normal Bowel Sounds] : normal bowel sounds [Normal Posterior Cervical Nodes] : no posterior cervical lymphadenopathy [Normal Anterior Cervical Nodes] : no anterior cervical lymphadenopathy [No CVA Tenderness] : no CVA  tenderness [No Spinal Tenderness] : no spinal tenderness [No Joint Swelling] : no joint swelling [Grossly Normal Strength/Tone] : grossly normal strength/tone [No Rash] : no rash [Coordination Grossly Intact] : coordination grossly intact [No Focal Deficits] : no focal deficits [Normal Gait] : normal gait [Deep Tendon Reflexes (DTR)] : deep tendon reflexes were 2+ and symmetric [Normal Affect] : the affect was normal [Normal Insight/Judgement] : insight and judgment were intact [de-identified] : right arm- lateral epicondylitis

## 2022-11-23 PROBLEM — N92.6 IRREGULAR MENSTRUAL CYCLE: Status: ACTIVE | Noted: 2022-09-14

## 2022-11-23 PROBLEM — D17.24 LIPOMA OF LEFT THIGH: Status: ACTIVE | Noted: 2022-09-14

## 2023-03-02 ENCOUNTER — NON-APPOINTMENT (OUTPATIENT)
Age: 43
End: 2023-03-02

## 2023-03-20 NOTE — ASU PATIENT PROFILE, ADULT - PROVIDER NOTIFICATION NAME

## 2023-04-03 DIAGNOSIS — L29.2 PRURITUS VULVAE: ICD-10-CM

## 2023-05-11 ENCOUNTER — APPOINTMENT (OUTPATIENT)
Dept: OBGYN | Facility: CLINIC | Age: 43
End: 2023-05-11
Payer: COMMERCIAL

## 2023-05-11 VITALS
WEIGHT: 169 LBS | BODY MASS INDEX: 28.16 KG/M2 | DIASTOLIC BLOOD PRESSURE: 87 MMHG | SYSTOLIC BLOOD PRESSURE: 144 MMHG | HEIGHT: 65 IN | HEART RATE: 69 BPM

## 2023-05-11 DIAGNOSIS — N83.202 UNSPECIFIED OVARIAN CYST, LEFT SIDE: ICD-10-CM

## 2023-05-11 DIAGNOSIS — R10.32 LEFT LOWER QUADRANT PAIN: ICD-10-CM

## 2023-05-11 PROCEDURE — 99213 OFFICE O/P EST LOW 20 MIN: CPT

## 2023-06-06 ENCOUNTER — NON-APPOINTMENT (OUTPATIENT)
Age: 43
End: 2023-06-06

## 2023-06-06 ENCOUNTER — APPOINTMENT (OUTPATIENT)
Dept: INTERNAL MEDICINE | Facility: CLINIC | Age: 43
End: 2023-06-06
Payer: COMMERCIAL

## 2023-06-06 VITALS
WEIGHT: 167 LBS | DIASTOLIC BLOOD PRESSURE: 82 MMHG | RESPIRATION RATE: 17 BRPM | TEMPERATURE: 97.8 F | SYSTOLIC BLOOD PRESSURE: 130 MMHG | BODY MASS INDEX: 27.82 KG/M2 | HEIGHT: 65 IN | OXYGEN SATURATION: 98 % | HEART RATE: 78 BPM

## 2023-06-06 DIAGNOSIS — D17.20 BENIGN LIPOMATOUS NEOPLASM OF SKIN AND SUBCUTANEOUS TISSUE OF UNSPECIFIED LIMB: ICD-10-CM

## 2023-06-06 DIAGNOSIS — Z00.00 ENCOUNTER FOR GENERAL ADULT MEDICAL EXAMINATION W/OUT ABNORMAL FINDINGS: ICD-10-CM

## 2023-06-06 PROCEDURE — 36415 COLL VENOUS BLD VENIPUNCTURE: CPT

## 2023-06-06 PROCEDURE — 99396 PREV VISIT EST AGE 40-64: CPT | Mod: 25

## 2023-06-06 PROCEDURE — 93000 ELECTROCARDIOGRAM COMPLETE: CPT

## 2023-06-06 RX ORDER — FLUCONAZOLE 150 MG/1
150 TABLET ORAL
Qty: 1 | Refills: 1 | Status: DISCONTINUED | COMMUNITY
Start: 2023-04-03 | End: 2023-06-06

## 2023-06-06 RX ORDER — METHYLPREDNISOLONE 4 MG/1
4 TABLET ORAL
Qty: 1 | Refills: 0 | Status: DISCONTINUED | COMMUNITY
Start: 2022-09-12 | End: 2023-06-06

## 2023-06-07 PROBLEM — D17.20 LIPOMA OF THIGH: Status: ACTIVE | Noted: 2023-06-07

## 2023-06-07 NOTE — HISTORY OF PRESENT ILLNESS
[FreeTextEntry1] : tomas [de-identified] : scheduled for left thigh lipoma removal 6/15/23\par Otherwise feels good. Appet, sleep, bms, voiding, mood all ok. no pain.\par Reviewed all interim as well as relevant prior consultations, labs and radiological studies.\par had some abd pain 5/6/23 and went to the ER- labs all ok. , ct a/p showed fibroids and mild terminal ileum thickening. \par

## 2023-06-07 NOTE — HEALTH RISK ASSESSMENT
[Good] : ~his/her~  mood as  good [No] : In the past 12 months have you used drugs other than those required for medical reasons? No [No falls in past year] : Patient reported no falls in the past year [0] : 2) Feeling down, depressed, or hopeless: Not at all (0) [PHQ-2 Negative - No further assessment needed] : PHQ-2 Negative - No further assessment needed [None] : None [With Family] : lives with family [Employed] : employed [Single] : single [Fully functional (bathing, dressing, toileting, transferring, walking, feeding)] : Fully functional (bathing, dressing, toileting, transferring, walking, feeding) [Fully functional (using the telephone, shopping, preparing meals, housekeeping, doing laundry, using] : Fully functional and needs no help or supervision to perform IADLs (using the telephone, shopping, preparing meals, housekeeping, doing laundry, using transportation, managing medications and managing finances) [Never] : Never [Audit-CScore] : 0 [de-identified] : exc [de-identified] : reg diet [[id number]] : 0 [Change in mental status noted] : No change in mental status noted [Language] : denies difficulty with language [Behavior] : denies difficulty with behavior [Handling Complex Tasks] : denies difficulty handling complex tasks [Reasoning] : denies difficulty with reasoning [Reports changes in hearing] : Reports no changes in hearing [Reports changes in vision] : Reports no changes in vision [Reports changes in dental health] : Reports no changes in dental health [AdvancecareDate] : 6/6/2023

## 2023-06-07 NOTE — ASSESSMENT
[FreeTextEntry1] : In optimal condition for procedure. \par Discussed healthy lifestyle, d/e, immunizations, gyn, sbe, mammo. Chk labs.\par Discussed the importance and benefit of a healthy lifestyle including a heart healthy diet such as the Mediterranean diet and regular exercise as well as 7 hours of sleep nightly.\par

## 2023-06-07 NOTE — PHYSICAL EXAM
[No Acute Distress] : no acute distress [Well Nourished] : well nourished [Well Developed] : well developed [Well-Appearing] : well-appearing [Normal Sclera/Conjunctiva] : normal sclera/conjunctiva [PERRL] : pupils equal round and reactive to light [EOMI] : extraocular movements intact [Normal Outer Ear/Nose] : the outer ears and nose were normal in appearance [Normal Oropharynx] : the oropharynx was normal [No JVD] : no jugular venous distention [No Lymphadenopathy] : no lymphadenopathy [Supple] : supple [Thyroid Normal, No Nodules] : the thyroid was normal and there were no nodules present [No Respiratory Distress] : no respiratory distress  [No Accessory Muscle Use] : no accessory muscle use [Clear to Auscultation] : lungs were clear to auscultation bilaterally [Normal Rate] : normal rate  [Regular Rhythm] : with a regular rhythm [Normal S1, S2] : normal S1 and S2 [No Murmur] : no murmur heard [No Carotid Bruits] : no carotid bruits [No Abdominal Bruit] : a ~M bruit was not heard ~T in the abdomen [No Varicosities] : no varicosities [Pedal Pulses Present] : the pedal pulses are present [No Edema] : there was no peripheral edema [No Palpable Aorta] : no palpable aorta [No Extremity Clubbing/Cyanosis] : no extremity clubbing/cyanosis [Normal Appearance] : normal in appearance [No Nipple Discharge] : no nipple discharge [No Axillary Lymphadenopathy] : no axillary lymphadenopathy [Soft] : abdomen soft [Non Tender] : non-tender [Non-distended] : non-distended [No Masses] : no abdominal mass palpated [No HSM] : no HSM [Normal Bowel Sounds] : normal bowel sounds [Normal Posterior Cervical Nodes] : no posterior cervical lymphadenopathy [Normal Anterior Cervical Nodes] : no anterior cervical lymphadenopathy [No CVA Tenderness] : no CVA  tenderness [No Spinal Tenderness] : no spinal tenderness [No Joint Swelling] : no joint swelling [Grossly Normal Strength/Tone] : grossly normal strength/tone [No Rash] : no rash [Coordination Grossly Intact] : coordination grossly intact [No Focal Deficits] : no focal deficits [Normal Gait] : normal gait [Deep Tendon Reflexes (DTR)] : deep tendon reflexes were 2+ and symmetric [Normal Affect] : the affect was normal [Normal Insight/Judgement] : insight and judgment were intact [de-identified] : reduction with no scarring/keloids [de-identified] : 1cm lipoma left anterolat thigh, mobile

## 2023-06-11 LAB
25(OH)D3 SERPL-MCNC: 29.6 NG/ML
ALBUMIN SERPL ELPH-MCNC: 4.1 G/DL
ALP BLD-CCNC: 87 U/L
ALT SERPL-CCNC: 7 U/L
ANION GAP SERPL CALC-SCNC: 8 MMOL/L
APPEARANCE: CLEAR
AST SERPL-CCNC: 7 U/L
BACTERIA UR CULT: NORMAL
BACTERIA: NEGATIVE /HPF
BILIRUB SERPL-MCNC: 0.3 MG/DL
BILIRUBIN URINE: NEGATIVE
BLOOD URINE: NEGATIVE
BUN SERPL-MCNC: 12 MG/DL
CALCIUM SERPL-MCNC: 9.4 MG/DL
CAST: 0 /LPF
CHLORIDE SERPL-SCNC: 105 MMOL/L
CHOLEST SERPL-MCNC: 233 MG/DL
CO2 SERPL-SCNC: 25 MMOL/L
COLOR: YELLOW
CREAT SERPL-MCNC: 0.74 MG/DL
EGFR: 104 ML/MIN/1.73M2
EPITHELIAL CELLS: 2 /HPF
ESTIMATED AVERAGE GLUCOSE: 91 MG/DL
GLUCOSE QUALITATIVE U: NEGATIVE MG/DL
GLUCOSE SERPL-MCNC: 98 MG/DL
HBA1C MFR BLD HPLC: 4.8 %
HDLC SERPL-MCNC: 65 MG/DL
KETONES URINE: NEGATIVE MG/DL
LDLC SERPL CALC-MCNC: 155 MG/DL
LEUKOCYTE ESTERASE URINE: NEGATIVE
MICROSCOPIC-UA: NORMAL
NITRITE URINE: NEGATIVE
NONHDLC SERPL-MCNC: 168 MG/DL
PH URINE: 5.5
POTASSIUM SERPL-SCNC: 4.4 MMOL/L
PROT SERPL-MCNC: 7.6 G/DL
PROTEIN URINE: NEGATIVE MG/DL
RED BLOOD CELLS URINE: 0 /HPF
SODIUM SERPL-SCNC: 139 MMOL/L
SPECIFIC GRAVITY URINE: 1.02
T3 SERPL-MCNC: 105 NG/DL
T4 FREE SERPL-MCNC: 1.2 NG/DL
TRIGL SERPL-MCNC: 66 MG/DL
TSH SERPL-ACNC: 2.15 UIU/ML
UROBILINOGEN URINE: 0.2 MG/DL
VIT B12 SERPL-MCNC: 1437 PG/ML
WHITE BLOOD CELLS URINE: 0 /HPF

## 2023-06-12 ENCOUNTER — OUTPATIENT (OUTPATIENT)
Dept: OUTPATIENT SERVICES | Facility: HOSPITAL | Age: 43
LOS: 1 days | End: 2023-06-12

## 2023-06-12 VITALS
SYSTOLIC BLOOD PRESSURE: 125 MMHG | RESPIRATION RATE: 16 BRPM | TEMPERATURE: 98 F | HEIGHT: 63 IN | OXYGEN SATURATION: 99 % | WEIGHT: 167.99 LBS | HEART RATE: 62 BPM | DIASTOLIC BLOOD PRESSURE: 86 MMHG

## 2023-06-12 DIAGNOSIS — Z98.890 OTHER SPECIFIED POSTPROCEDURAL STATES: Chronic | ICD-10-CM

## 2023-06-12 DIAGNOSIS — Z98.891 HISTORY OF UTERINE SCAR FROM PREVIOUS SURGERY: Chronic | ICD-10-CM

## 2023-06-12 DIAGNOSIS — D17.24 BENIGN LIPOMATOUS NEOPLASM OF SKIN AND SUBCUTANEOUS TISSUE OF LEFT LEG: ICD-10-CM

## 2023-06-12 LAB — HCG UR QL: NEGATIVE — SIGNIFICANT CHANGE UP

## 2023-06-12 RX ORDER — ACETAMINOPHEN 500 MG
2 TABLET ORAL
Qty: 0 | Refills: 0 | DISCHARGE

## 2023-06-12 RX ORDER — IBUPROFEN 200 MG
1 TABLET ORAL
Qty: 0 | Refills: 0 | DISCHARGE

## 2023-06-12 NOTE — H&P PST ADULT - NSICDXPASTSURGICALHX_GEN_ALL_CORE_FT
PAST SURGICAL HISTORY:  H/O abdominoplasty     H/O  section     History of D&C     S/P bilateral breast reduction 2020

## 2023-06-12 NOTE — H&P PST ADULT - PROBLEM SELECTOR PLAN 1
Patient tentatively scheduled for excision of left thigh mass for 6/15/23. Pre-op instructions provided. Pt given verbal and written instructions with teach back on chlorhexidine shampoo and pepcid. Pt verbalized understanding with return demonstration.     UCG done.  copy of cbc cmp 6/6/23 in chart.  copy of ekg 6/6/23 in chart.

## 2023-06-12 NOTE — H&P PST ADULT - NEGATIVE ENDOCRINE SYMPTOMS
Last office visit with 07/18/22 was on angel [facility]  (must be current)    Last Pap Smear: 04/05/2021    Last Depo shot: 07/23/22    Patient is within time frame: No    Urine pregnancy test result: neg    Patient sexually active since LMP or since they were due Depo? no  If no, may restart Depo with negative pregnancy test. If yes, cannot restart Depo today.    Does the patient confirm abstinence and is the patient aware that receiving Depo Provera in the event of an early, unidentified pregnancy may cause harm? yes    Next Depo shot is due between: 2/22-3/8    Depo given IM in right arm. Patient tolerated injection well with no complaints.    Pt has no questions/concerns today.    no cold intolerance/no heat intolerance

## 2023-06-12 NOTE — H&P PST ADULT - HISTORY OF PRESENT ILLNESS
44 y/o female presents to presurgical testing with diagnosis of benign lipomatous neoplasm of skin and subcutaneous left leg. Pt is scheduled for excision of left thigh mass.

## 2023-06-12 NOTE — H&P PST ADULT - NSICDXPASTMEDICALHX_GEN_ALL_CORE_FT
PAST MEDICAL HISTORY:  Excessive and frequent menstruation with regular cycle     HLD (hyperlipidemia)     Lipoma     Polyp of cervix uteri

## 2023-06-12 NOTE — H&P PST ADULT - PAIN SCORE
[FreeTextEntry1] : Baby ASA\par Pepcid 20 BID\par Vitamin D 2000 units/day\par Swab and COVID labs\par Prophylactic monoclonal antibody.\par Rest home and isolate. \par P.o. hydration.\par Close observation.\par Follow O2 saturation.\par Call or follow-up as soon as possible if increased respiratory symptoms.\par \par 
0

## 2023-06-13 ENCOUNTER — NON-APPOINTMENT (OUTPATIENT)
Age: 43
End: 2023-06-13

## 2023-06-14 ENCOUNTER — TRANSCRIPTION ENCOUNTER (OUTPATIENT)
Age: 43
End: 2023-06-14

## 2023-06-15 ENCOUNTER — OUTPATIENT (OUTPATIENT)
Dept: OUTPATIENT SERVICES | Facility: HOSPITAL | Age: 43
LOS: 1 days | Discharge: HOME | End: 2023-06-15
Payer: COMMERCIAL

## 2023-06-15 ENCOUNTER — TRANSCRIPTION ENCOUNTER (OUTPATIENT)
Age: 43
End: 2023-06-15

## 2023-06-15 VITALS
DIASTOLIC BLOOD PRESSURE: 86 MMHG | WEIGHT: 167.99 LBS | HEART RATE: 53 BPM | TEMPERATURE: 98 F | RESPIRATION RATE: 18 BRPM | HEIGHT: 63 IN | SYSTOLIC BLOOD PRESSURE: 126 MMHG | OXYGEN SATURATION: 100 %

## 2023-06-15 VITALS
SYSTOLIC BLOOD PRESSURE: 132 MMHG | HEART RATE: 60 BPM | DIASTOLIC BLOOD PRESSURE: 78 MMHG | TEMPERATURE: 97 F | OXYGEN SATURATION: 98 % | RESPIRATION RATE: 18 BRPM

## 2023-06-15 DIAGNOSIS — Z98.890 OTHER SPECIFIED POSTPROCEDURAL STATES: Chronic | ICD-10-CM

## 2023-06-15 DIAGNOSIS — D17.24 BENIGN LIPOMATOUS NEOPLASM OF SKIN AND SUBCUTANEOUS TISSUE OF LEFT LEG: ICD-10-CM

## 2023-06-15 DIAGNOSIS — Z98.891 HISTORY OF UTERINE SCAR FROM PREVIOUS SURGERY: Chronic | ICD-10-CM

## 2023-06-15 PROCEDURE — 88304 TISSUE EXAM BY PATHOLOGIST: CPT | Mod: 26

## 2023-06-15 NOTE — ASU DISCHARGE PLAN (ADULT/PEDIATRIC) - CALL YOUR DOCTOR IF YOU HAVE ANY OF THE FOLLOWING:
Bleeding that does not stop/Swelling that gets worse/Pain not relieved by Medications Bleeding that does not stop/Swelling that gets worse/Pain not relieved by Medications/Fever greater than (need to indicate Fahrenheit or Celsius)/Wound/Surgical Site with redness, or foul smelling discharge or pus/Nausea and vomiting that does not stop/Unable to urinate/Inability to tolerate liquids or foods

## 2023-06-15 NOTE — ASU PREOP CHECKLIST - TEMPERATURE IN FAHRENHEIT (DEGREES F)
[facility]   Pelvic Health Physical Therapy Initial Evaluation     Date: 3/30/2023   Name: Loren Levin [facility] Number: [id number]    Therapy Diagnosis:   Encounter Diagnoses   Name Primary?    Diastasis recti     Diastasis of rectus abdominis     Lack of coordination      Physician: Amarilis James CNM    Physician Orders: PT Eval and Treat   Medical Diagnosis from Referral: M62.08 (ICD-10-CM) - Diastasis recti   Evaluation Date: 3/30/2023  Authorization Period Expiration: 2023   Plan of Care Expiration: 2023  Progress Note Due: 2023  Visit # / Visits authorized:    FOTO: None    Precautions:  ROSE MARIE 2023, older daughter is 2-[26 months]    Time In: 9:35  Time Out: 10:30  Total Appointment Time (timed & untimed codes): 55 minutes    SUBJECTIVE     Date of onset/History of current condition - Loren reports:  She got an emergency c section with her last pregnancy and is not sure if she wants to have a v back or another C section. Would like to work on Diastasis recti prevention and preparing for birth. Last pregancy she has some bad hemorrhoids, they taught her how to poop and that helped. Denies bladder leakage. Feels some vaginal dryness, uses lubricant.     OB/GYN History: , caesarean, and pushing phase over 2 hours Had epidural putshed for a long time then Emergency C section  Sexually active? Yes  Pain with vaginal exams, intercourse or tampon use? No    Bladder/Bowel History:   Frequency of urination:   Daytime: 1x ever 1-2 hours           Nighttime: 1x  Difficulty initiating urine stream: No  Urine stream: normal, tries not to push it out  Complete emptying: For the most par yes, but feels somewhat klike there is pressure there  Bladder leakage: No  Urinary Urgency: No,  Frequency of bowel movements: once a day  Difficulty initiating BM: No  Quality/Shape of BM: [place] Stool Chart 5  Does Patient Feel Empty after BM? Yes  Fiber Supplements or  "Laxative Use? No  Colon leakage: Yes (unwanted emmision of gas,"it just happens)      Pain:  Location: denies pain      Prior Therapy: Yes, has not kept up with exercise  Social History:  lives with their family  Current exercise: walk, swim, do prenatal yoga (about 3x/week)  Occupation: Patient works as a [employer] in [employer] [place] and job-related duties include pretty sedentary unless she is traveling.  Prior Level of Function: Iwith ADL  Current Level of Function: Sometimes when picking up daughter it feels heavy at the vagina (daughter is about 30 #), will feel that when lifting heav things    Types of fluid intake: 1 cup of coffee in the morning and water throught the day  Diet: Feels like she is eating a lot of carbs, fruits, getting protein in with shakes, salty foods, and salad,meat and 2 veg   Habitus: well developed, well nourished  Abuse/Neglect:      Patients goals: Limit and/or improve diastasis and prepare for pregnancy      Medical History: Loren  has a past medical history of Migraines () and PROM (premature rupture of membranes) (2021).     Surgical History: Loren Dickerson  has a past surgical history that includes [place] tooth extraction ();  section (N/A, 2021); and  section.    Medications: Loren has a current medication list which includes the following prescription(s): escitalopram oxalate, escitalopram oxalate, lorazepam, and pnv-dha.    Allergies: Review of patient's allergies indicates:  No Known Allergies     OBJECTIVE     See EMR under MEDIA for written consent provided 3/30/2023  Chaperone: none, consents signed and on file    ORTHO SCREEN  Posture in sitting: WNL  Posture in standing: forward and rounded shoulders   Pelvic alignment:  R leg appears shorter in supine    SI Joint Palpation: Denies tenderness to SI joint palpation bilaterally.  Adductor Palpation: no tenderness noted        Lower Extremity Strength  Right LE  Left LE    Hip " flexion: 4+/5 Hip flexion: 4+/5   Hip extension:  4/5 Hip extension: 4/5   Hip abduction: 4+/5 Hip abduction: 4+/5   Hip adduction:  3+/5 Hip adduction:  3+/5       ABDOMINAL WALL ASSESSMENT  Palpation: tender R lower abdominal region  Scarring: C section scar well healed, maybe some tenderness on the R side  Pelvic Floor Muscle and Transverse Abdominus Synergy: present  Diastasis: present superior to umbilicus, 1 phalanx        VAGINAL PELVIC FLOOR EXAM    EXTERNAL ASSESSMENT  Introitus: WNL  Skin condition: WNL  Scarring: none   Sensation: WNL   Pain: none  Voluntary contraction: visible lift  Voluntary relaxation: visible drop  Involuntary contraction: nil  Bearing down: nil  Perineal descent: absent        INTERNAL ASSESSMENT  Pain: trigger points as follows: L side introitus   Sensation: able to localized pressure appropriately   Vaginal vault: WNL   Muscle Bulk: WFL   Muscle Power: 3/5  Muscle Endurance: 10 sec  # Reps To Fatigue: 5    Fast Contractions in 10 seconds: 3     Quality of contraction: slow relaxation   Specificity: patient contracts: Abdominals and Gluts   Coordination: cannot isolate PFM from abdominals   Prolapse check: NT      FOTO N/A       TREATMENT     Total Treatment time (time-based codes) separate from Evaluation: 15 minutes       Therapeutic Activity to improve dynamic functional performance for 15 minutes including:      [x] Education on visual cues/mental imagery for pelvic floor relaxation  [x] Instruction on the Knack for reduction of stress urinary incontinence  [x] Instruction on log-roll technique for transfers to reduce strain on back/abdominal wall, reduce incidence of incontinence  [x] Instruction on pressure management to protect pelvic organ prolapse/prevent incontinence  [x] HEP building/HEP review    PATIENT EDUCATION AND HOME EXERCISES     Education provided:   general anatomy/physiology of urinary/ bowel  system and benefits of treatment were discussed with the patient.  Additionally, anatomy/physiology of pelvic floor, diaphragmatic breathing, isometric abdominal exercises, and kegels were reviewed.     Written Home Exercises provided: yes.  Exercises were reviewed and Loren was able to demonstrate them prior to the end of the session. Loren demonstrated good  understanding of the education provided. See EMR under Patient Instructions for exercises provided during therapy sessions.    ASSESSMENT     Loren is a 39 y.o. female referred to outpatient Physical Therapy with a medical diagnosis of M62.08 (ICD-10-CM) - Diastasis recti . Pt presents with the goal of limiting or preventing diastasis recti in pregnancy and preparing for the birth of baby#2. Completed movement assessment with good movility and fair strength in the hips with some decrease in strength with hip abd with pubic symphysis pain. Slight leg length discrepency with posterior rotated L innominate (did not preform METs). Pt has some abdominal seperatation ubove the umbilicus and difficulty assessing below the umbilicus due to pregnancy. Pt given beginner exercise with cues to draw the belly button into the spine. Completed pelvic floor assessment with L side introitus pain. Fair strength with good relaxation, some decreased endurance. Plan to work on pelvic floor strengthening and eventually incorporate the transverse abdominal muscles for overflow exercises and activation.    Patient prognosis is Excellent.   Patient will benefit from skilled outpatient Physical Therapy to address the deficits stated above and in the chart below, provide patient/family education, and to maximize patient's level of independence.     Plan of care discussed with patient: Yes  Patient's spiritual, cultural and educational needs considered and patient is agreeable to the plan of care and goals as stated below:     Anticipated Barriers for therapy: growing baby/abdomen affecting the diastasis    Medical Necessity is demonstrated by the  following:  History  Co-morbidities and personal factors that may impact the plan of care Co-morbidities    section (N/A, 2021), Migraines () and PROM (premature rupture of membranes) (2021)    Personal Factors  lifestyle     low   Examination  Body structures and functions, activity limitations and participation restrictions that may impact the plan of care Body Regions/Systems/Functions:  pelvic asymmetry, poor trunk stability, decreased pelvic muscle strength, and decreased endurance of the pelvic muscles     Activity Limitations:  Pressure with ADLs    Participation Restrictions:  social activities with friends/family and Pelvic pressure with ADLs.     Activity limitations:   Learning and applying knowledge  no deficits    General Tasks and Commands  no deficits    Communication  no deficits    Mobility  lifting and carrying objects    Self care  no deficits    Domestic Life  no deficits    Interactions/Relationships  no deficits    Life Areas  no deficits    Community and Social Life  no deficits       moderate   Clinical Presentation stable and uncomplicated low   Decision Making/ Complexity Score: moderate     Goals:  Short Term Goals: 6 weeks     Pt to increase pelvic floor strength to at least 3/5 needed for pelvic support with ADLs and social activities.  Pt to demonstrate being able to correctly and consistently perform a kegel which is needed for  Pt to be able to perform a 7 second kegel x 10 reps to demonstrate improving strength and endurance.  Pt to demonstrate a decrease in her diastasis recti to 1 finger widths or less to demonstrate improving abdominal wall support and coordination for improved ADL participation.        Long Term Goals: 12 weeks     Pt to be discharged with home plan for carry over after discharge.   Pt to be able to perform a 10 second kegel x 10 reps to demonstrate improving strength and endurance.  Pt will be trained and compliant with postural strategies  in sitting and standing to improve alignment and decrease pain and muscle fatigue  Pt to increase strength by 1 grade to improve lumbopelvic stability needed to decrease pain with ADLs.  Pt to demonstrate proper body mechanics with lifting to decrease strain on lumbopelvic and abdominal structures with ADLs.     PLAN     Plan of care Certification: 3/30/2023 to 6/30/2023.    Outpatient Physical Therapy 1 time(s) every 2-3 week(s) for 10-12 weeks to include the following interventions: Electrical Stimulation SEMG, Manual Therapy, Moist Heat/ Ice, Neuromuscular Re-ed, Patient Education, Self Care, Therapeutic Activities, Therapeutic Exercise, and Ultrasound.     Kirsten Wilde, PT      I CERTIFY THE NEED FOR THESE SERVICES FURNISHED UNDER THIS PLAN OF TREATMENT AND WHILE UNDER MY CARE   Physician's comments:     Physician's Signature: ___________________________________________________     97.5

## 2023-06-15 NOTE — ASU DISCHARGE PLAN (ADULT/PEDIATRIC) - ASU DC SPECIAL INSTRUCTIONSFT
Use tylenol every 6 hours (1000mg) for pain control. In between those tylenol doses take 200-400mg ibuprofen so that you receive one medication every 3 hours. You may shower after 24 hours. Do not scrub over your incisions. Leave the small stickers aka steri strips in place and they will fall off on their own. Make a follow up appointment with your surgeon in the next week. Call the office to book it.

## 2023-06-15 NOTE — ASU PREOPERATIVE ASSESSMENT, ADULT ([facility] ONLY) - FALL HARM RISK - UNIVERSAL INTERVENTIONS
Bed in lowest position, wheels locked, appropriate side rails in place/Call bell, personal items and telephone in reach/Instruct patient to call for assistance before getting out of bed or chair/Non-slip footwear when patient is out of bed/[place] to call system/Physically safe environment - no spills, clutter or unnecessary equipment/Purposeful Proactive Rounding/Room/bathroom lighting operational, light cord in reach

## 2023-06-15 NOTE — BRIEF OPERATIVE NOTE - NSICDXBRIEFPROCEDURE_GEN_ALL_CORE_FT
PROCEDURES:  Surgical removal of subcutaneous soft tissue mass of abdominal wall, greater than 3 cm in diameter 15-Juan A-2023 08:25:14  Cosmo Self

## 2023-06-15 NOTE — ASU DISCHARGE PLAN (ADULT/PEDIATRIC) - CARE PROVIDER_API CALL
Mauricio Anderson  Surgery  [address], [address]  [place] [address]  Phone: [phone]  Fax: [phone]  Follow Up Time: 1 week

## 2023-06-27 LAB — SURGICAL PATHOLOGY STUDY: SIGNIFICANT CHANGE UP

## 2023-07-12 ENCOUNTER — NON-APPOINTMENT (OUTPATIENT)
Age: 43
End: 2023-07-12

## 2023-09-12 PROBLEM — D17.9 BENIGN LIPOMATOUS NEOPLASM, UNSPECIFIED: Chronic | Status: ACTIVE | Noted: 2023-06-12

## 2023-09-12 PROBLEM — E78.5 HYPERLIPIDEMIA, UNSPECIFIED: Chronic | Status: ACTIVE | Noted: 2023-06-12

## 2023-09-13 ENCOUNTER — OUTPATIENT (OUTPATIENT)
Dept: OUTPATIENT SERVICES | Facility: HOSPITAL | Age: 43
LOS: 1 days | End: 2023-09-13

## 2023-09-13 ENCOUNTER — TRANSCRIPTION ENCOUNTER (OUTPATIENT)
Age: 43
End: 2023-09-13

## 2023-09-13 VITALS
WEIGHT: 164.02 LBS | RESPIRATION RATE: 16 BRPM | TEMPERATURE: 97 F | HEIGHT: 65 IN | HEART RATE: 60 BPM | DIASTOLIC BLOOD PRESSURE: 80 MMHG | OXYGEN SATURATION: 98 % | SYSTOLIC BLOOD PRESSURE: 126 MMHG

## 2023-09-13 DIAGNOSIS — Z98.890 OTHER SPECIFIED POSTPROCEDURAL STATES: Chronic | ICD-10-CM

## 2023-09-13 DIAGNOSIS — K64.5 PERIANAL VENOUS THROMBOSIS: ICD-10-CM

## 2023-09-13 DIAGNOSIS — Z98.891 HISTORY OF UTERINE SCAR FROM PREVIOUS SURGERY: Chronic | ICD-10-CM

## 2023-09-13 LAB
ANION GAP SERPL CALC-SCNC: 12 MMOL/L — SIGNIFICANT CHANGE UP (ref 7–14)
BUN SERPL-MCNC: 11 MG/DL — SIGNIFICANT CHANGE UP (ref 7–23)
CALCIUM SERPL-MCNC: 9 MG/DL — SIGNIFICANT CHANGE UP (ref 8.4–10.5)
CHLORIDE SERPL-SCNC: 104 MMOL/L — SIGNIFICANT CHANGE UP (ref 98–107)
CO2 SERPL-SCNC: 22 MMOL/L — SIGNIFICANT CHANGE UP (ref 22–31)
CREAT SERPL-MCNC: 0.76 MG/DL — SIGNIFICANT CHANGE UP (ref 0.5–1.3)
EGFR: 100 ML/MIN/1.73M2 — SIGNIFICANT CHANGE UP
GLUCOSE SERPL-MCNC: 94 MG/DL — SIGNIFICANT CHANGE UP (ref 70–99)
HCG UR QL: NEGATIVE — SIGNIFICANT CHANGE UP
HCT VFR BLD CALC: 38.4 % — SIGNIFICANT CHANGE UP (ref 34.5–45)
HGB BLD-MCNC: 12.3 G/DL — SIGNIFICANT CHANGE UP (ref 11.5–15.5)
MCHC RBC-ENTMCNC: 27.6 PG — SIGNIFICANT CHANGE UP (ref 27–34)
MCHC RBC-ENTMCNC: 32 GM/DL — SIGNIFICANT CHANGE UP (ref 32–36)
MCV RBC AUTO: 86.3 FL — SIGNIFICANT CHANGE UP (ref 80–100)
NRBC # BLD: 0 /100 WBCS — SIGNIFICANT CHANGE UP (ref 0–0)
NRBC # FLD: 0 K/UL — SIGNIFICANT CHANGE UP (ref 0–0)
PLATELET # BLD AUTO: 253 K/UL — SIGNIFICANT CHANGE UP (ref 150–400)
POTASSIUM SERPL-MCNC: 4 MMOL/L — SIGNIFICANT CHANGE UP (ref 3.5–5.3)
POTASSIUM SERPL-SCNC: 4 MMOL/L — SIGNIFICANT CHANGE UP (ref 3.5–5.3)
RBC # BLD: 4.45 M/UL — SIGNIFICANT CHANGE UP (ref 3.8–5.2)
RBC # FLD: 12.7 % — SIGNIFICANT CHANGE UP (ref 10.3–14.5)
SODIUM SERPL-SCNC: 138 MMOL/L — SIGNIFICANT CHANGE UP (ref 135–145)
WBC # BLD: 4.6 K/UL — SIGNIFICANT CHANGE UP (ref 3.8–10.5)
WBC # FLD AUTO: 4.6 K/UL — SIGNIFICANT CHANGE UP (ref 3.8–10.5)

## 2023-09-13 RX ORDER — SODIUM CHLORIDE 9 MG/ML
1000 INJECTION, SOLUTION INTRAVENOUS
Refills: 0 | Status: DISCONTINUED | OUTPATIENT
Start: 2023-09-14 | End: 2023-09-14

## 2023-09-13 NOTE — H&P PST ADULT - CARDIOVASCULAR
details… regular rate and rhythm/S1 S2 present/no gallops/no rub/vascular regular rate and rhythm/S1 S2 present/no gallops/no rub/no JVD/vascular

## 2023-09-13 NOTE — H&P PST ADULT - HISTORY OF PRESENT ILLNESS
43 year old female with pre op dx of hemorrhoids and  perianal venous thrombosis is scheduled for  hemorrhoidectomy external hemorrhoids.

## 2023-09-13 NOTE — H&P PST ADULT - PROBLEM SELECTOR PLAN 1
Patient tentatively scheduled for hemorrhoidectomy external hemorrhoids  on 09/14/2023.    Pre-op instructions provided. Pt given verbal and written instructions with teach back on pepcid. Pt verbalized understanding with return demonstration.    Urine cup provided for day of procedure pregnancy test.     Labs done stat.    Discussed case with Anesthesiologist Dr. cortez to next day case. Patient tentatively scheduled for hemorrhoidectomy external hemorrhoids  on 09/14/2023.    Pre-op instructions provided. Pt given verbal and written instructions with teach back on pepcid. Pt verbalized understanding with return demonstration.    Urine cup provided for day of procedure pregnancy test.     Labs done stat.    Discussed case with Anesthesiologist Dr. Piyush Reyes  due to next day case.

## 2023-09-13 NOTE — H&P PST ADULT - NSICDXPASTMEDICALHX_GEN_ALL_CORE_FT
PAST MEDICAL HISTORY:  Excessive and frequent menstruation with regular cycle     HLD (hyperlipidemia)     Lipoma     Perianal venous thrombosis     Polyp of cervix uteri

## 2023-09-13 NOTE — H&P PST ADULT - MUSCULOSKELETAL
ROM intact/no calf tenderness/normal gait/strength 5/5 bilateral upper extremities/strength 5/5 bilateral lower extremities/extremities exam negative

## 2023-09-13 NOTE — H&P PST ADULT - GENITOURINARY COMMENTS
Pre op dx- Hemorrhoids and  perianal venous thrombosis Pt c/o hemorrhoid that grew bigger since last few years

## 2023-09-14 ENCOUNTER — INPATIENT (INPATIENT)
Facility: HOSPITAL | Age: 43
LOS: 2 days | Discharge: HOME | End: 2023-09-17
Attending: SPECIALIST | Admitting: SPECIALIST
Payer: COMMERCIAL

## 2023-09-14 ENCOUNTER — TRANSCRIPTION ENCOUNTER (OUTPATIENT)
Age: 43
End: 2023-09-14

## 2023-09-14 VITALS
WEIGHT: 164.02 LBS | HEIGHT: 65 IN | RESPIRATION RATE: 15 BRPM | SYSTOLIC BLOOD PRESSURE: 133 MMHG | DIASTOLIC BLOOD PRESSURE: 81 MMHG | TEMPERATURE: 98 F | HEART RATE: 50 BPM | OXYGEN SATURATION: 100 %

## 2023-09-14 DIAGNOSIS — Z98.890 OTHER SPECIFIED POSTPROCEDURAL STATES: Chronic | ICD-10-CM

## 2023-09-14 DIAGNOSIS — Z98.891 HISTORY OF UTERINE SCAR FROM PREVIOUS SURGERY: Chronic | ICD-10-CM

## 2023-09-14 DIAGNOSIS — K64.5 PERIANAL VENOUS THROMBOSIS: ICD-10-CM

## 2023-09-14 LAB
ALBUMIN SERPL ELPH-MCNC: 4 G/DL — SIGNIFICANT CHANGE UP (ref 3.3–5)
ALP SERPL-CCNC: 88 U/L — SIGNIFICANT CHANGE UP (ref 40–120)
ALT FLD-CCNC: 22 U/L — SIGNIFICANT CHANGE UP (ref 4–33)
ANION GAP SERPL CALC-SCNC: 11 MMOL/L — SIGNIFICANT CHANGE UP (ref 7–14)
AST SERPL-CCNC: 17 U/L — SIGNIFICANT CHANGE UP (ref 4–32)
BASE EXCESS BLDV CALC-SCNC: -2.8 MMOL/L — LOW (ref -2–3)
BILIRUB SERPL-MCNC: 0.2 MG/DL — SIGNIFICANT CHANGE UP (ref 0.2–1.2)
BUN SERPL-MCNC: 9 MG/DL — SIGNIFICANT CHANGE UP (ref 7–23)
CA-I SERPL-SCNC: 1.21 MMOL/L — SIGNIFICANT CHANGE UP (ref 1.15–1.33)
CALCIUM SERPL-MCNC: 9.2 MG/DL — SIGNIFICANT CHANGE UP (ref 8.4–10.5)
CHLORIDE BLDV-SCNC: 102 MMOL/L — SIGNIFICANT CHANGE UP (ref 96–108)
CHLORIDE SERPL-SCNC: 101 MMOL/L — SIGNIFICANT CHANGE UP (ref 98–107)
CO2 BLDV-SCNC: 27.2 MMOL/L — HIGH (ref 22–26)
CO2 SERPL-SCNC: 22 MMOL/L — SIGNIFICANT CHANGE UP (ref 22–31)
CREAT SERPL-MCNC: 0.84 MG/DL — SIGNIFICANT CHANGE UP (ref 0.5–1.3)
EGFR: 89 ML/MIN/1.73M2 — SIGNIFICANT CHANGE UP
GAS PNL BLDV: 129 MMOL/L — LOW (ref 136–145)
GAS PNL BLDV: SIGNIFICANT CHANGE UP
GAS PNL BLDV: SIGNIFICANT CHANGE UP
GLUCOSE BLDC GLUCOMTR-MCNC: 80 MG/DL — SIGNIFICANT CHANGE UP (ref 70–99)
GLUCOSE BLDV-MCNC: 181 MG/DL — HIGH (ref 70–99)
GLUCOSE SERPL-MCNC: 168 MG/DL — HIGH (ref 70–99)
HCG UR QL: NEGATIVE — SIGNIFICANT CHANGE UP
HCO3 BLDV-SCNC: 25 MMOL/L — SIGNIFICANT CHANGE UP (ref 22–29)
HCT VFR BLD CALC: 40.9 % — SIGNIFICANT CHANGE UP (ref 34.5–45)
HCT VFR BLDA CALC: 42 % — SIGNIFICANT CHANGE UP (ref 34.5–46.5)
HGB BLD CALC-MCNC: 13.9 G/DL — SIGNIFICANT CHANGE UP (ref 11.7–16.1)
HGB BLD-MCNC: 13 G/DL — SIGNIFICANT CHANGE UP (ref 11.5–15.5)
LACTATE BLDV-MCNC: 3.3 MMOL/L — HIGH (ref 0.5–2)
MAGNESIUM SERPL-MCNC: 1.9 MG/DL — SIGNIFICANT CHANGE UP (ref 1.6–2.6)
MCHC RBC-ENTMCNC: 28.1 PG — SIGNIFICANT CHANGE UP (ref 27–34)
MCHC RBC-ENTMCNC: 31.8 GM/DL — LOW (ref 32–36)
MCV RBC AUTO: 88.5 FL — SIGNIFICANT CHANGE UP (ref 80–100)
NRBC # BLD: 0 /100 WBCS — SIGNIFICANT CHANGE UP (ref 0–0)
NRBC # FLD: 0 K/UL — SIGNIFICANT CHANGE UP (ref 0–0)
PCO2 BLDV: 58 MMHG — HIGH (ref 39–52)
PH BLDV: 7.25 — LOW (ref 7.32–7.43)
PHOSPHATE SERPL-MCNC: 2.4 MG/DL — LOW (ref 2.5–4.5)
PLATELET # BLD AUTO: 219 K/UL — SIGNIFICANT CHANGE UP (ref 150–400)
PO2 BLDV: 31 MMHG — SIGNIFICANT CHANGE UP (ref 25–45)
POTASSIUM BLDV-SCNC: 4 MMOL/L — SIGNIFICANT CHANGE UP (ref 3.5–5.1)
POTASSIUM SERPL-MCNC: 4 MMOL/L — SIGNIFICANT CHANGE UP (ref 3.5–5.3)
POTASSIUM SERPL-SCNC: 4 MMOL/L — SIGNIFICANT CHANGE UP (ref 3.5–5.3)
PROT SERPL-MCNC: 8.2 G/DL — SIGNIFICANT CHANGE UP (ref 6–8.3)
RBC # BLD: 4.62 M/UL — SIGNIFICANT CHANGE UP (ref 3.8–5.2)
RBC # FLD: 13 % — SIGNIFICANT CHANGE UP (ref 10.3–14.5)
SAO2 % BLDV: 38.6 % — LOW (ref 67–88)
SODIUM SERPL-SCNC: 134 MMOL/L — LOW (ref 135–145)
WBC # BLD: 6.96 K/UL — SIGNIFICANT CHANGE UP (ref 3.8–10.5)
WBC # FLD AUTO: 6.96 K/UL — SIGNIFICANT CHANGE UP (ref 3.8–10.5)

## 2023-09-14 PROCEDURE — 0042T: CPT

## 2023-09-14 PROCEDURE — 70496 CT ANGIOGRAPHY HEAD: CPT | Mod: 26

## 2023-09-14 PROCEDURE — 70450 CT HEAD/BRAIN W/O DYE: CPT | Mod: 26,59

## 2023-09-14 PROCEDURE — 70498 CT ANGIOGRAPHY NECK: CPT | Mod: 26

## 2023-09-14 PROCEDURE — 88304 TISSUE EXAM BY PATHOLOGIST: CPT | Mod: 26

## 2023-09-14 RX ORDER — HYDROMORPHONE HYDROCHLORIDE 2 MG/ML
0.25 INJECTION INTRAMUSCULAR; INTRAVENOUS; SUBCUTANEOUS EVERY 6 HOURS
Refills: 0 | Status: DISCONTINUED | OUTPATIENT
Start: 2023-09-14 | End: 2023-09-15

## 2023-09-14 RX ORDER — LEVETIRACETAM 250 MG/1
500 TABLET, FILM COATED ORAL EVERY 12 HOURS
Refills: 0 | Status: DISCONTINUED | OUTPATIENT
Start: 2023-09-15 | End: 2023-09-17

## 2023-09-14 RX ORDER — OXYCODONE HYDROCHLORIDE 5 MG/1
5 TABLET ORAL EVERY 6 HOURS
Refills: 0 | Status: DISCONTINUED | OUTPATIENT
Start: 2023-09-14 | End: 2023-09-15

## 2023-09-14 RX ORDER — SODIUM CHLORIDE 9 MG/ML
500 INJECTION, SOLUTION INTRAVENOUS ONCE
Refills: 0 | Status: COMPLETED | OUTPATIENT
Start: 2023-09-14 | End: 2023-09-14

## 2023-09-14 RX ORDER — SODIUM CHLORIDE 9 MG/ML
1000 INJECTION INTRAMUSCULAR; INTRAVENOUS; SUBCUTANEOUS
Refills: 0 | Status: DISCONTINUED | OUTPATIENT
Start: 2023-09-14 | End: 2023-09-14

## 2023-09-14 RX ORDER — ACETAMINOPHEN 500 MG
650 TABLET ORAL EVERY 6 HOURS
Refills: 0 | Status: DISCONTINUED | OUTPATIENT
Start: 2023-09-14 | End: 2023-09-14

## 2023-09-14 RX ORDER — LEVETIRACETAM 250 MG/1
1000 TABLET, FILM COATED ORAL ONCE
Refills: 0 | Status: DISCONTINUED | OUTPATIENT
Start: 2023-09-14 | End: 2023-09-14

## 2023-09-14 RX ORDER — OXYCODONE HYDROCHLORIDE 5 MG/1
2.5 TABLET ORAL EVERY 6 HOURS
Refills: 0 | Status: DISCONTINUED | OUTPATIENT
Start: 2023-09-14 | End: 2023-09-15

## 2023-09-14 RX ORDER — ONDANSETRON 8 MG/1
4 TABLET, FILM COATED ORAL ONCE
Refills: 0 | Status: COMPLETED | OUTPATIENT
Start: 2023-09-14 | End: 2023-09-14

## 2023-09-14 RX ORDER — SODIUM CHLORIDE 9 MG/ML
1000 INJECTION INTRAMUSCULAR; INTRAVENOUS; SUBCUTANEOUS
Refills: 0 | Status: DISCONTINUED | OUTPATIENT
Start: 2023-09-14 | End: 2023-09-15

## 2023-09-14 RX ORDER — HEPARIN SODIUM 5000 [USP'U]/ML
5000 INJECTION INTRAVENOUS; SUBCUTANEOUS EVERY 8 HOURS
Refills: 0 | Status: DISCONTINUED | OUTPATIENT
Start: 2023-09-14 | End: 2023-09-17

## 2023-09-14 RX ORDER — ACETAMINOPHEN 500 MG
1000 TABLET ORAL EVERY 6 HOURS
Refills: 0 | Status: COMPLETED | OUTPATIENT
Start: 2023-09-14 | End: 2023-09-15

## 2023-09-14 RX ADMIN — SODIUM CHLORIDE 1000 MILLILITER(S): 9 INJECTION, SOLUTION INTRAVENOUS at 18:09

## 2023-09-14 RX ADMIN — LEVETIRACETAM 1000 MILLIGRAM(S): 250 TABLET, FILM COATED ORAL at 23:05

## 2023-09-14 RX ADMIN — ONDANSETRON 4 MILLIGRAM(S): 8 TABLET, FILM COATED ORAL at 18:08

## 2023-09-14 RX ADMIN — SODIUM CHLORIDE 75 MILLILITER(S): 9 INJECTION INTRAMUSCULAR; INTRAVENOUS; SUBCUTANEOUS at 21:00

## 2023-09-14 RX ADMIN — HYDROMORPHONE HYDROCHLORIDE 0.25 MILLIGRAM(S): 2 INJECTION INTRAMUSCULAR; INTRAVENOUS; SUBCUTANEOUS at 21:35

## 2023-09-14 RX ADMIN — HYDROMORPHONE HYDROCHLORIDE 0.25 MILLIGRAM(S): 2 INJECTION INTRAMUSCULAR; INTRAVENOUS; SUBCUTANEOUS at 21:59

## 2023-09-14 NOTE — CONSULT NOTE ADULT - ASSESSMENT
43y F with Hx HLD, breast reduction, , who presented for hemorrhoidectomy. Had seizure-like activity in PACU and subsequent exam was notable for L facial droop. Code stroke called for further evaluation.    LKW: 6pm   NIHSS: 10  MRS: 0  Not thrombolytics candidate due to outside of time window  Not thrombectomy candidate due to []    CTH:  CTA:    Impression: post-op GTC episode, aborted with Ativan and propofol. Subsequent exam notable for L facial droop and lethargy. Most suspicious for Aquilino's paralysis. Would treat as seizure for now and prioritize EEG. Obtain MR brain when able    Recommendations:  [] urgent vEEG 24h  [] load with Keppra 1g 1x stat  [] start Keppra 500mg q12h (first dose to be given 4-6h following load)  [] rescue medications: 1) Ativan 1mg 1x for GTC > 3 min, 2) Vimpat 200mg 1x  [] check A1c, lipid panel, coag, CK, lactate  [] obtain MR brain w/ w/o contrast when able  [] frequent neuro checks per ICU protocol  [] will continue to follow  [] rest of care per primary team    Case discussed w/ stroke fellow Dr. Scott Michael regarding thrombolytics/thrombectomy candidacy. Will be formally staffed on AM rounds. Recommendations preliminary until attested.   43y F with Hx HLD, breast reduction, , who presented for hemorrhoidectomy. Had seizure-like activity in PACU and subsequent exam was notable for L facial droop. Code stroke called for further evaluation.    LKW: 6pm   NIHSS: 10  MRS: 0  Not thrombolytics candidate due to outside of time window  Not thrombectomy candidate due to lack of LVO    CTH: small L occipital hypodensity, no acute hemorrhage  CTA: no LVO, 5mL penumbra in L occipital with no core infarct    Impression: post-op GTC episode, aborted with Ativan and propofol. Subsequent exam notable for L facial droop and lethargy. Most suspicious for Aquilino's paralysis. Would treat as seizure for now and prioritize EEG. Also found to have L occipital hypodensity and complaining of blurry vision, concerning for possible infarct. Will start on aspirin. Obtain MR brain when able    Recommendations:  [] urgent vEEG 24h  [] load with Keppra 1g 1x stat  [] start Keppra 500mg q12h (first dose to be given 4-6h following load)  [] rescue medications: 1) Ativan 1mg 1x for GTC > 3 min, 2) Vimpat 200mg 1x  [] start aspirin 81mg daily  [] check A1c, lipid panel, coag, CK, lactate  [] obtain MR brain w/ w/o contrast when able  [] telemetry, TTE, will discuss ILR  [] permissive HTN up to 220/110 for 24-48h  [] PT/OT evaluation when able  [] frequent neuro checks per ICU protocol  [] will continue to follow  [] rest of care per primary team    Case discussed w/ stroke fellow Dr. Scott Michael regarding thrombolytics/thrombectomy candidacy. Will be formally staffed on AM rounds. Recommendations preliminary until attested.   43y F with Hx HLD, breast reduction, , who presented for hemorrhoidectomy. Had seizure-like activity in PACU and subsequent exam was notable for L facial droop. Code stroke called for further evaluation.    LKW: 6pm   NIHSS: 10  MRS: 0  Not thrombolytics candidate due to outside of time window  Not thrombectomy candidate due to lack of LVO    CTH: no acute hemorrhage  CTA: no LVO or perfusion abnormalities    Impression: post-op GTC episode, aborted with Ativan and propofol. Subsequent exam notable for L facial droop and lethargy. Most suspicious for Aquilino's paralysis. Would treat as seizure for now and prioritize EEG. Obtain MR brain when able    Recommendations:  [] urgent vEEG 24h  [] load with Keppra 1g 1x stat  [] start Keppra 500mg q12h (first dose to be given 4-6h following load)  [] rescue medications: 1) Ativan 1mg 1x for GTC > 3 min, 2) Vimpat 200mg 1x  [] check A1c, lipid panel, coag, CK, lactate  [] obtain MR brain w/ w/o contrast when able  [] PT/OT evaluation when able  [] frequent neuro checks per ICU protocol  [] will continue to follow  [] rest of care per primary team    Case discussed w/ stroke fellow Dr. Scott Michael regarding thrombolytics/thrombectomy candidacy. Will be formally staffed on AM rounds. Recommendations preliminary until attested.   43y F with Hx HLD, breast reduction, , who presented for hemorrhoidectomy. Had seizure-like activity in PACU and subsequent exam was notable for L facial droop. Code stroke called for further evaluation.    LKW: 6pm   NIHSS: 10  MRS: 0  Not thrombolytics candidate due to outside of time window  Not thrombectomy candidate due to lack of LVO    CTH: no acute hemorrhage  CTA: no LVO or perfusion abnormalities    Impression: post-op GTC episode, aborted with Ativan and propofol. Subsequent exam notable for L facial droop and lethargy. Most suspicious for Aquilino's paralysis. Would treat as seizure for now and prioritize EEG. Obtain MR brain when able    Recommendations:  [] urgent vEEG 24h  [] load with Keppra 1g 1x stat  [] start Keppra 500mg q12h (first dose to be given 4-6h following load)  [] rescue medications: 1) Ativan 1mg 1x for GTC > 3 min, 2) Vimpat 200mg 1x  [] check A1c, lipid panel, coag, CK, lactate  [] obtain MR brain w/ w/o contrast when able  [] PT/OT evaluation when able  [] frequent neuro checks per ICU protocol  [] will continue to follow  [] rest of care per primary team    Case discussed w/ stroke fellow Dr. Scott Michael regarding thrombolytics/thrombectomy candidacy. Also discussed w/ general attending Dr. Holt. Will be formally staffed on AM rounds. Recommendations preliminary until attested.

## 2023-09-14 NOTE — ASU DISCHARGE PLAN (ADULT/PEDIATRIC) - NURSING INSTRUCTIONS
You were given 1000mg IV Tylenol for pain management.  Please DO NOT take any Tylenol containing products, such as  Vicodin, Percocet, Excedrin, many cold preparations for the next 6 hours (until ___10:00PM____ ).  DO NOT EXCEED 3000MG OF TYLENOL OVER 24 HOURS.

## 2023-09-14 NOTE — CONSULT NOTE ADULT - ATTENDING COMMENTS
Ms. Parikh is a 44yo F with Hx HLD, breast reduction, , who presented for hemorrhoidectomy. Had seizure-like activity in PACU consisting of 2-3 min of generalized tonic clonic shaking with post event confusion and loss of time.  Her immediate subsequent exam was notable for L facial droop and she has since returned to her baseline  Initial LKW: 6pm   NIHSS: 10  MRS: 0  Not thrombolytics candidate due to outside of time window  Not thrombectomy candidate due to lack of LVO    CTH: no acute hemorrhage  CTA: no LVO or perfusion abnormalities    On exam:  9/15/23 1030 am:  Pt awake alert and oriented * 3  cn 2-12 intact.  NO facial droop noted.  EOm I.  No noted focal weakness in 4/4 extremities.  No noted tremor  NO dysmetria.  No sensory loss.    Impression: Likely Generalized Tonic Clonic Seizure.  r/o provoked due to anaesthesia and surgery.    Recommendations:  [] COnt vEEG 24h  [] loaded with Keppra 1g 1x stat and to maintain 500mg bid  [] rescue medications: 1) Ativan 1mg 1x for GTC > 3 min, 2) Vimpat 200mg 1x  [] obtain MR brain w/ w/o contrast when able  [] PT/OT evaluation when able  [] frequent neuro checks per ICU protocol

## 2023-09-14 NOTE — BRIEF OPERATIVE NOTE - NSICDXBRIEFPOSTOP_GEN_ALL_CORE_FT
POST-OP DIAGNOSIS:  Internal and external hemorrhoids without complication 14-Sep-2023 17:03:41  Amanda Rodríguez

## 2023-09-14 NOTE — ASU DISCHARGE PLAN (ADULT/PEDIATRIC) - ASU DC SPECIAL INSTRUCTIONSFT
WOUND CARE:   DRESSINGS: leave dressings and rectal tampon in place for 24hrs. If need to have a bowel movement before then, patient may remove the dressings and take out the rectal tampon.  BATHING: Take at least 2 sitz baths per day until follow up with Dr. Anderson. May take showers.  MEDICATION: Take senna or colace once a day everyday until follow up with Dr. Anderson.  ACTIVITY: No heavy lifting or straining. Otherwise, you may return to your usual level of physical activity. If you are taking narcotic pain medication (such as Percocet), do NOT drive a car, operate machinery or make important decisions.  DIET: Return to your usual diet.  NOTIFY YOUR SURGEON IF: You have any bleeding that does not stop, any pus draining from your wound, any fever (over 100.4 F) or chills, persistent nausea/vomiting, persistent diarrhea, or if your pain is not controlled on your discharge pain medications.  FOLLOW-UP:  1. Please call to make a follow-up appointment within one week of discharge   2. Please follow up with your primary care physician in one week regarding your hospitalization.

## 2023-09-14 NOTE — ASU DISCHARGE PLAN (ADULT/PEDIATRIC) - CARE PROVIDER_API CALL
Mauricio Anderson  Surgery  [address], [address]  [place] [address]  Phone: [phone]  Fax: [phone]  Follow Up Time: 1 week

## 2023-09-14 NOTE — BRIEF OPERATIVE NOTE - OPERATION/FINDINGS
Superior and left hemorrhoid were external and internal. Right hemorrhoid was internal only. Underwent triple hemorrhoidectomy.

## 2023-09-14 NOTE — RAPID RESPONSE TEAM SUMMARY - NSSITUATIONBACKGROUNDRRT_GEN_ALL_CORE
43 F PMH external hemorrhoid with perianal venous thrombosis now s/p elective hemorrhoidectomy. RRT called for a post-op witnessed generalized tonic-clonic seizure. Per nursing, patient started seizing ~1min prior to RRT being called. On arrival, patient found to be unresponsive on NRB saturating wnl; 's; HR 70's appears sinus on monitor; FS 80.  Received Ativan 2mg x1 and Propofol 20mg x1 and subsequently seizure resolved. D50 amp x1 given. No known seizure history. Patient is in a postictal phase. After 2-3mins became minimally verbal. Neurology team was contacted for recommendations. Repeat vitals afebrile; SpO2 100%; ; HR 70's appears sinus on monitor. Surgical team will obtain labs and follow up with neurology team. Patient will be transferred to main PACU for further monitoring of post-anesthesia recovery and admitted to surgical service under Alexandra Anderson MD.

## 2023-09-14 NOTE — ASU DISCHARGE PLAN (ADULT/PEDIATRIC) - CALL YOUR DOCTOR IF YOU HAVE ANY OF THE FOLLOWING:
Bleeding that does not stop/Swelling that gets worse/Pain not relieved by Medications/Fever greater than (need to indicate Fahrenheit or Celsius)/Wound/Surgical Site with redness, or foul smelling discharge or pus/Nausea and vomiting that does not stop/Excessive diarrhea

## 2023-09-14 NOTE — BRIEF OPERATIVE NOTE - NSICDXBRIEFPROCEDURE_GEN_ALL_CORE_FT
PROCEDURES:  Simple hemorrhoidectomy, internal and external 14-Sep-2023 17:03:03  Amanda Rodríguez

## 2023-09-14 NOTE — CONSULT NOTE ADULT - SUBJECTIVE AND OBJECTIVE BOX
HPI: 43 F PMH no history of epilepsy s/p elective hemorrhoidectomy for perianal venous thrombosis on . Post-op course complicated by witnessed generalized tonic-clonic seizure. Pt unresponsive, hemodynamically stable. Pt received Ativan 2mg x1, Propofol 20mg x1, and D50 amp x1 given. Patient is in a postictal phase. After 2-3mins became minimally verbal. Neurology team was contacted for recommendations. Repeat vitals afebrile; SpO2 100%; ; HR 70's appears sinus on monitor. Surgical team will obtain labs and follow up with neurology team. Patient will be transferred to main PACU for further monitoring of post-anesthesia recovery and admitted to surgical service under Alexandra Anderson MD.      PAST MEDICAL & SURGICAL HISTORY:  Polyp of cervix uteri      Excessive and frequent menstruation with regular cycle      HLD (hyperlipidemia)      Lipoma      Perianal venous thrombosis      S/P bilateral breast reduction  2020      H/O abdominoplasty      H/O  section      History of D&C          REVIEW OF SYSTEMS      General:No Weight change/ Fatigue/ HA/Dizzy	    Skin/Breast: No Rashes/ Lesions/ Masses  	  Ophthalmologic: No Blurry vision/ Glaucoma/ Blindness  	  ENMT: No Hearing loss/ Drainage/ Lesions	    Respiratory and Thorax: No Cough/ Wheezing/ SOB/ Hemoptysis/ Sputum production  	  Cardiovascular: No Chest pain/ Palpitations/ Diaphoresis	    Gastrointestinal: No Nausea/ Vomiting/ Constipation/ Appetite Change	    Genitourinary: No Heamturia/ Dysuria/ Frequency change/ Impotence	    Musculoskeletal: No Pain/ Weakness/ Claudication	    Neurological: No Seizures/ TIA/CVA/ Parastesias	    Psychiatric: No Dementia/ Depression/ SI/HI	    Hematology/Lymphatics: No hx of bleeding/ Edema	    Endocrine:	No Hyperglycemia/ Hypoglycemia    Allergic/Immunologic:	 No Anaphylaxis/ Intolerance/ Recent illnesses    MEDICATIONS  (STANDING):    MEDICATIONS  (PRN):      Allergies    No Known Allergies    Intolerances        SOCIAL HISTORY:  Occupation:  Smoking Hx: denies  Etoh Hx: denies  IVDA Hx: denies    FAMILY HISTORY:  FH: type 2 diabetes  father    FH: hypertension  mother      unless noted, no significant family hx with Mother, Father, Siblings    Vital Signs Last 24 Hrs  T(C): 37 (14 Sep 2023 19:40), Max: 37 (14 Sep 2023 19:40)  T(F): 98.6 (14 Sep 2023 19:40), Max: 98.6 (14 Sep 2023 19:40)  HR: 67 (14 Sep 2023 20:00) (50 - 93)  BP: 149/96 (14 Sep 2023 20:00) (112/93 - 191/103)  BP(mean): 109 (14 Sep 2023 20:00) (82 - 128)  RR: 13 (14 Sep 2023 20:00) (12 - 22)  SpO2: 99% (14 Sep 2023 20:00) (97% - 100%)    Parameters below as of 14 Sep 2023 19:40  Patient On (Oxygen Delivery Method): room air        General: WN/WD NAD  Neurology: Awake, nonfocal, PA x 4  Eyes: Scleras clear, PERRLA/ EOMI, Gross vision intact  ENT:Gross hearing intact, grossly patent pharynx, no stridor  Neck: Neck supple, trachea midline, No JVD,   Respiratory: CTA B/L, No wheezing, rales, rhonchi  CV: RRR, S1S2, no murmurs, rubs or gallops  Abdominal: Soft, NT, ND +BS,   Extremities: No edema, + peripheral pulses  Skin: No Rashes, Hematoma, Ecchymosis  Lymphatic: No Neck, axilla, groin LAD  Psych: Oriented x 3, normal affect  Incisions:   Tubes:    LABS:                        13.0   6.96  )-----------( 219      ( 14 Sep 2023 19:45 )             40.9     09-13    138  |  104  |  11  ----------------------------<  94  4.0   |  22  |  0.76    Ca    9.0      13 Sep 2023 07:30        Urinalysis Basic - ( 13 Sep 2023 07:30 )    Color: x / Appearance: x / SG: x / pH: x  Gluc: 94 mg/dL / Ketone: x  / Bili: x / Urobili: x   Blood: x / Protein: x / Nitrite: x   Leuk Esterase: x / RBC: x / WBC x   Sq Epi: x / Non Sq Epi: x / Bacteria: x        RADIOLOGY & ADDITIONAL STUDIES:    ASSESSMENT:   43yFemalePAST MEDICAL & SURGICAL HISTORY:  Polyp of cervix uteri      Excessive and frequent menstruation with regular cycle      HLD (hyperlipidemia)      Lipoma      Perianal venous thrombosis      S/P bilateral breast reduction  2020      H/O abdominoplasty      H/O  section      History of D&C      HEALTH ISSUES - PROBLEM Dx:      HEALTH ISSUES - R/O PROBLEM Dx:      PLAN: HPI: 43 F PMH no history of epilepsy s/p elective hemorrhoidectomy for perianal venous thrombosis on . Post-op course complicated by witnessed generalized tonic-clonic seizure. Pt unresponsive, hemodynamically stable. Pt received Ativan 2mg x1, Propofol 20mg x1, and D50 amp x1 given. Pt became minimally verbally responsive 2-3 min later. Neurology consulted and STAT labs were drawn. Pt transferred to PACU. Pt AO3, post-ictal at bedside. Denies fever, chills, shortness of breath, nausea, vomiting, chest pain. SICU consulted for hemodynamic monitoring.     PAST MEDICAL & SURGICAL HISTORY:  Polyp of cervix uteri  Excessive and frequent menstruation with regular cycle  HLD (hyperlipidemia)  Lipoma  Perianal venous thrombosis  S/P bilateral breast reduction  2020  H/O abdominoplasty  H/O  section  History of D&C    REVIEW OF SYSTEMS  above    MEDICATIONS  (STANDING):    MEDICATIONS  (PRN):    Allergies  No Known Allergies    Intolerances    SOCIAL HISTORY:  Smoking Hx: denies  Etoh Hx: denies  IVDA Hx: denies    FAMILY HISTORY:  FH: type 2 diabetes  father    FH: hypertension  mother  unless noted, no significant family hx with Mother, Father, Siblings    Vital Signs Last 24 Hrs  T(C): 37 (14 Sep 2023 19:40), Max: 37 (14 Sep 2023 19:40)  T(F): 98.6 (14 Sep 2023 19:40), Max: 98.6 (14 Sep 2023 19:40)  HR: 67 (14 Sep 2023 20:00) (50 - 93)  BP: 149/96 (14 Sep 2023 20:00) (112/93 - 191/103)  BP(mean): 109 (14 Sep 2023 20:00) (82 - 128)  RR: 13 (14 Sep 2023 20:00) (12 - 22)  SpO2: 99% (14 Sep 2023 20:00) (97% - 100%)    Parameters below as of 14 Sep 2023 19:40  Patient On (Oxygen Delivery Method): room air    General: NAD  Neurology: AO3, post-ictal, responds to commands, but sluggish; CN 2-9 and 11-12 intact  Respiratory: Bilateral chest rise on RA  CV: pulse present  Abdominal: Soft, NT, ND   Extremities: No edema, + peripheral pulses  Skin: No Rashes, Hematoma, Ecchymosis  Lymphatic: No Neck, axilla, groin LAD  Psych: Oriented x 3, normal affect    LABS:                        13.0   6.96  )-----------( 219      ( 14 Sep 2023 19:45 )             40.9     09-    138  |  104  |  11  ----------------------------<  94  4.0   |  22  |  0.76    Ca    9.0      13 Sep 2023 07:30    Urinalysis Basic - ( 13 Sep 2023 07:30 )    Color: x / Appearance: x / SG: x / pH: x  Gluc: 94 mg/dL / Ketone: x  / Bili: x / Urobili: x   Blood: x / Protein: x / Nitrite: x   Leuk Esterase: x / RBC: x / WBC x   Sq Epi: x / Non Sq Epi: x / Bacteria: x    RADIOLOGY & ADDITIONAL STUDIES:  n/a    ASSESSMENT:   43 F PMH no history of epilepsy s/p elective hemorrhoidectomy for perianal venous thrombosis on . Post-op course complicated by witnessed generalized tonic-clonic seizure. SICU consulted for hemodynamic monitoring.    Neuro:  - Assess neurological status q1  - MS: AO3  - Multimodal pain control w/ tylenol, oxy    Resp:  - Out of bed to chair, ambulate as tolerated, and incentive spirometry to prevent atelectasis  - On RA    CVS:   - Not on pressors    GI:   - s/p hemorrhoidectomy   - NPO with IVF    Renal:  - Monitor I&Os and electrolytes  - replete electrolytes as needed     Heme:  - Monitor CBC and coags  - VTE prophylaxis: SQH  - SCD's    ID: sepsis:  - Monitor for clinical evidence of active infection  - Monitor WBC, temperature, and procalcitonin    Endo:   -No active issues

## 2023-09-14 NOTE — CONSULT NOTE ADULT - SUBJECTIVE AND OBJECTIVE BOX
Neurology - Consult Note    -  Spectra: [phone] ([facility]), [phone] ([facility])  -    HPI: Patient ELI HOUSTON is a 43y (1980) woman with a PMHx significant for breast reduction, , HLD, who presented for simple hemorrhoidectomy. She was recovering in PACU when had witnessed GTC episode, lasting for 2-3 minutes. Patient has no prior history of seizure d/o. Episode stopped after Ativan and propofol. Per SICU provider, patient was examined at 6pm and was found to be AOx3, slow to respond, and complaining of blurry vision. Patient could not recall why she was in the hospital. CN 2-12 were intact at that time. Accepted to SICU for further work-up. CBC and CMP were unremarkable. Went to bedside to assess patient. Found to be resting in bed, preferring to keep eyes closed. Oriented to self and year, speaking quietly, not following commands. Gaze is midline. Moderate L facial droop was noted, for which code stroke was called. Patient able to maintain UE b/l anti-gravity and squeeze hands b/l. Wiggling toes b/l, but no movement of LE against gravity. Grimacing/groaning to noxious stimuli in all extremities. Brought for emergent CT imaging. Loaded w/ Keppra 1g 1x stat.      Review of Systems:  unable to assess due to mental status    Allergies:  No Known Allergies      PMHx/PSHx/Family Hx: As above, otherwise see below   No pertinent past medical history    Polyp of cervix uteri    Excessive and frequent menstruation with regular cycle    HLD (hyperlipidemia)    Lipoma    Perianal venous thrombosis        Social Hx:  unknown    Medications:  MEDICATIONS  (STANDING):  acetaminophen   IVPB .. 1000 milliGRAM(s) IV Intermittent every 6 hours  heparin   Injectable 5000 Unit(s) SubCutaneous every 8 hours  levETIRAcetam Injectable 1000 milliGRAM(s) IV Push once  sodium chloride 0.9%. 1000 milliLiter(s) (75 mL/Hr) IV Continuous <Continuous>    MEDICATIONS  (PRN):  HYDROmorphone  Injectable 0.25 milliGRAM(s) IV Push every 6 hours PRN Severe Pain (7 - 10)  LORazepam     Tablet 4 milliGRAM(s) Oral once PRN Agitation  oxyCODONE    IR 2.5 milliGRAM(s) Oral every 6 hours PRN Mild Pain (1 - 3)  oxyCODONE    IR 5 milliGRAM(s) Oral every 6 hours PRN Moderate Pain (4 - 6)      Vitals:  T(C): 37 (23 @ 20:00), Max: 37 (23 @ 19:40)  HR: 58 (23 @ 22:00) (50 - 93)  BP: 133/92 (23 @ 22:00) (112/93 - 191/103)  RR: 17 (23 @ 22:00) (12 - 22)  SpO2: 100% (23 @ 22:00) (95% - 100%)    Physical Examination:   General - NAD, lethargic female  Cardiovascular - Peripheral pulses palpable, no edema  Eyes - Fundoscopy not performed due to safety precautions in the setting of the COVID-19 pandemic    Neurologic Exam:  Mental status - lethargic, oriented to person and time. Hypophonic speech. Not following commands    Cranial nerves - PERRLA, EOM intact, gaze midline, face sensation (V1-V3) intact b/l, moderate L facial droop, hearing intact b/l, palate with symmetric elevation, tongue midline on protrusion    Motor - Normal bulk and tone throughout. No drift in b/l UE. Wiggling toes b/l in LE. No movement of LE against gravity.    Sensation - intact to noxious stimuli throughout    DTR's -             Biceps      Triceps     Brachioradialis      Patellar    Ankle    Toes/plantar response  R             2+             2+                  2+                       2+            2+                 Down  L              2+             2+                 2+                        2+           2+                 Down    Coordination - No tremors appreciated    Gait and station - did not assess due to poor mental status    Labs:                        13.0   6.96  )-----------( 219      ( 14 Sep 2023 19:45 )             40.9     -14    134<L>  |  101  |  9   ----------------------------<  168<H>  4.0   |  22  |  0.84    Ca    9.2      14 Sep 2023 19:45  Phos  2.4     -  Mg     1.90     -    TPro  8.2  /  Alb  4.0  /  TBili  0.2  /  DBili  x   /  AST  17  /  ALT  22  /  AlkPhos  88  09-14    CAPILLARY BLOOD GLUCOSE      POCT Blood Glucose.: 80 mg/dL (14 Sep 2023 18:56)    LIVER FUNCTIONS - ( 14 Sep 2023 19:45 )  Alb: 4.0 g/dL / Pro: 8.2 g/dL / ALK PHOS: 88 U/L / ALT: 22 U/L / AST: 17 U/L / GGT: x                           Radiology:  CTH/A/P pending

## 2023-09-15 LAB
ANION GAP SERPL CALC-SCNC: 10 MMOL/L — SIGNIFICANT CHANGE UP (ref 7–14)
APTT BLD: 35.2 SEC — SIGNIFICANT CHANGE UP (ref 24.5–35.6)
BUN SERPL-MCNC: 8 MG/DL — SIGNIFICANT CHANGE UP (ref 7–23)
CALCIUM SERPL-MCNC: 9.3 MG/DL — SIGNIFICANT CHANGE UP (ref 8.4–10.5)
CHLORIDE SERPL-SCNC: 103 MMOL/L — SIGNIFICANT CHANGE UP (ref 98–107)
CO2 SERPL-SCNC: 19 MMOL/L — LOW (ref 22–31)
CREAT SERPL-MCNC: 0.69 MG/DL — SIGNIFICANT CHANGE UP (ref 0.5–1.3)
EGFR: 111 ML/MIN/1.73M2 — SIGNIFICANT CHANGE UP
GLUCOSE BLDC GLUCOMTR-MCNC: 122 MG/DL — HIGH (ref 70–99)
GLUCOSE SERPL-MCNC: 120 MG/DL — HIGH (ref 70–99)
HCT VFR BLD CALC: 39.9 % — SIGNIFICANT CHANGE UP (ref 34.5–45)
HGB BLD-MCNC: 13 G/DL — SIGNIFICANT CHANGE UP (ref 11.5–15.5)
INR BLD: 1.12 RATIO — SIGNIFICANT CHANGE UP (ref 0.85–1.18)
LACTATE SERPL-SCNC: 0.7 MMOL/L — SIGNIFICANT CHANGE UP (ref 0.5–2)
LACTATE SERPL-SCNC: 0.8 MMOL/L — SIGNIFICANT CHANGE UP (ref 0.5–2)
MAGNESIUM SERPL-MCNC: 1.8 MG/DL — SIGNIFICANT CHANGE UP (ref 1.6–2.6)
MCHC RBC-ENTMCNC: 27.8 PG — SIGNIFICANT CHANGE UP (ref 27–34)
MCHC RBC-ENTMCNC: 32.6 GM/DL — SIGNIFICANT CHANGE UP (ref 32–36)
MCV RBC AUTO: 85.3 FL — SIGNIFICANT CHANGE UP (ref 80–100)
NRBC # BLD: 0 /100 WBCS — SIGNIFICANT CHANGE UP (ref 0–0)
NRBC # FLD: 0 K/UL — SIGNIFICANT CHANGE UP (ref 0–0)
PHOSPHATE SERPL-MCNC: 2.9 MG/DL — SIGNIFICANT CHANGE UP (ref 2.5–4.5)
PLATELET # BLD AUTO: 250 K/UL — SIGNIFICANT CHANGE UP (ref 150–400)
POTASSIUM SERPL-MCNC: 4.2 MMOL/L — SIGNIFICANT CHANGE UP (ref 3.5–5.3)
POTASSIUM SERPL-SCNC: 4.2 MMOL/L — SIGNIFICANT CHANGE UP (ref 3.5–5.3)
PROTHROM AB SERPL-ACNC: 12.6 SEC — SIGNIFICANT CHANGE UP (ref 9.5–13)
RBC # BLD: 4.68 M/UL — SIGNIFICANT CHANGE UP (ref 3.8–5.2)
RBC # FLD: 12.7 % — SIGNIFICANT CHANGE UP (ref 10.3–14.5)
SODIUM SERPL-SCNC: 132 MMOL/L — LOW (ref 135–145)
WBC # BLD: 8.6 K/UL — SIGNIFICANT CHANGE UP (ref 3.8–10.5)
WBC # FLD AUTO: 8.6 K/UL — SIGNIFICANT CHANGE UP (ref 3.8–10.5)

## 2023-09-15 PROCEDURE — 99253 IP/OBS CNSLTJ NEW/EST LOW 45: CPT

## 2023-09-15 PROCEDURE — 99233 SBSQ HOSP IP/OBS HIGH 50: CPT

## 2023-09-15 RX ORDER — POLYETHYLENE GLYCOL 3350 17 G/17G
17 POWDER, FOR SOLUTION ORAL DAILY
Refills: 0 | Status: DISCONTINUED | OUTPATIENT
Start: 2023-09-15 | End: 2023-09-17

## 2023-09-15 RX ORDER — SENNA PLUS 8.6 MG/1
2 TABLET ORAL AT BEDTIME
Refills: 0 | Status: DISCONTINUED | OUTPATIENT
Start: 2023-09-15 | End: 2023-09-17

## 2023-09-15 RX ORDER — OXYCODONE HYDROCHLORIDE 5 MG/1
5 TABLET ORAL EVERY 6 HOURS
Refills: 0 | Status: DISCONTINUED | OUTPATIENT
Start: 2023-09-15 | End: 2023-09-17

## 2023-09-15 RX ORDER — OXYCODONE HYDROCHLORIDE 5 MG/1
2.5 TABLET ORAL EVERY 6 HOURS
Refills: 0 | Status: DISCONTINUED | OUTPATIENT
Start: 2023-09-15 | End: 2023-09-17

## 2023-09-15 RX ORDER — INFLUENZA VIRUS VACCINE 15; 15; 15; 15 UG/.5ML; UG/.5ML; UG/.5ML; UG/.5ML
0.5 SUSPENSION INTRAMUSCULAR ONCE
Refills: 0 | Status: COMPLETED | OUTPATIENT
Start: 2023-09-15 | End: 2023-09-15

## 2023-09-15 RX ADMIN — Medication 1000 MILLIGRAM(S): at 18:39

## 2023-09-15 RX ADMIN — POLYETHYLENE GLYCOL 3350 17 GRAM(S): 17 POWDER, FOR SOLUTION ORAL at 11:27

## 2023-09-15 RX ADMIN — LEVETIRACETAM 400 MILLIGRAM(S): 250 TABLET, FILM COATED ORAL at 03:32

## 2023-09-15 RX ADMIN — HEPARIN SODIUM 5000 UNIT(S): 5000 INJECTION INTRAVENOUS; SUBCUTANEOUS at 14:46

## 2023-09-15 RX ADMIN — Medication 1000 MILLIGRAM(S): at 06:30

## 2023-09-15 RX ADMIN — HEPARIN SODIUM 5000 UNIT(S): 5000 INJECTION INTRAVENOUS; SUBCUTANEOUS at 05:40

## 2023-09-15 RX ADMIN — LEVETIRACETAM 400 MILLIGRAM(S): 250 TABLET, FILM COATED ORAL at 16:11

## 2023-09-15 RX ADMIN — SENNA PLUS 2 TABLET(S): 8.6 TABLET ORAL at 21:50

## 2023-09-15 RX ADMIN — HEPARIN SODIUM 5000 UNIT(S): 5000 INJECTION INTRAVENOUS; SUBCUTANEOUS at 21:51

## 2023-09-15 RX ADMIN — Medication 400 MILLIGRAM(S): at 05:40

## 2023-09-15 RX ADMIN — Medication 1000 MILLIGRAM(S): at 12:00

## 2023-09-15 RX ADMIN — Medication 400 MILLIGRAM(S): at 11:28

## 2023-09-15 RX ADMIN — Medication 400 MILLIGRAM(S): at 18:26

## 2023-09-15 NOTE — PATIENT PROFILE ADULT - FALL HARM RISK - UNIVERSAL INTERVENTIONS
Bed in lowest position, wheels locked, appropriate side rails in place/Call bell, personal items and telephone in reach/Instruct patient to call for assistance before getting out of bed or chair/Non-slip footwear when patient is out of bed/[place] to call system/Physically safe environment - no spills, clutter or unnecessary equipment/Purposeful Proactive Rounding/Room/bathroom lighting operational, light cord in reach

## 2023-09-15 NOTE — PROGRESS NOTE ADULT - ASSESSMENT
Assessment:  43 F PMH no history of epilepsy s/p elective hemorrhoidectomy for perianal venous thrombosis on 9/14. Post-op course complicated by witnessed generalized tonic-clonic seizure. SICU consulted for hemodynamic monitoring.    Plan:  -Receive excellent SICU care  -Consult neuro for seizure management Assessment:  43 F PMH no history of epilepsy s/p elective hemorrhoidectomy for perianal venous thrombosis on 9/14. Post-op course complicated by witnessed generalized tonic-clonic seizure. SICU consulted for hemodynamic monitoring.    Plan:  -Receive excellent SICU care  -Appreciate neuro recs  -DVT ppx  -LVF: NS@75  -Pain Control  -Diet: regular  -Ying in place  -Neuro checks Q2  -Dispo: pending    A Team Surgery, [id number]

## 2023-09-15 NOTE — PROGRESS NOTE ADULT - SUBJECTIVE AND OBJECTIVE BOX
A Team (General Surgery) Daily Progress Note    SUBJECTIVE:  Pt seen and examined, and is resting comfortably in bed. Pt is POD1 from triple hemorrhoidectomy. Overnight: Pt had a tonic-clonic seizure for 3 minutes post-op in PACU, which was broken with ativan and propofol. Neuro was called and noted L-sided facial droop, so a code stroke was called. Head CT and CTA were negative. 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 Patient was transferred to SICU for hemodynamic monitoring. This AM, patient reports feeling well. Pain is adequately controlled on current regimen. Pt has no complaints at this time. Negative for flatus and BM. Rectal tampon nand dressings are in place, will be removed this afternoon.    OBJECTIVE:  Vital Signs Last 24 Hrs  T(C): 36.7 (15 Sep 2023 04:00), Max: 37 (14 Sep 2023 19:40)  T(F): 98.1 (15 Sep 2023 04:00), Max: 98.6 (14 Sep 2023 19:40)  HR: 73 (15 Sep 2023 07:00) (50 - 93)  BP: 133/98 (15 Sep 2023 07:00) (108/93 - 191/103)  BP(mean): 107 (15 Sep 2023 07:00) (76 - 128)  RR: 22 (15 Sep 2023 07:00) (12 - 26)  SpO2: 100% (15 Sep 2023 07:00) (95% - 100%)    Parameters below as of 15 Sep 2023 00:15  Patient On (Oxygen Delivery Method): nasal cannula  O2 Flow (L/min): 2      I&O's Detail    14 Sep 2023 07:01  -  15 Sep 2023 07:00  --------------------------------------------------------  IN:    Lactated Ringers Bolus: 500 mL    Oral Fluid: 120 mL    sodium chloride 0.9%: 300 mL  Total IN: 920 mL    OUT:    Indwelling Catheter - Urethral (mL): 1065 mL    Voided (mL): 600 mL  Total OUT: 1665 mL    Total NET: -745 mL        Exam:  GEN: A&Ox3, NAD  HEENT: atraumatic, normocephalic  CV: no JVD  RESP: no increased work of breathing, no use of accessory muscles  GI/ABD: soft, nontender, nondistended  EXTREMITIES: warm, pink, well-perfused                        13.0   8.60  )-----------( 250      ( 15 Sep 2023 02:45 )             39.9       09-15    132<L>  |  103  |  8   ----------------------------<  120<H>  4.2   |  19<L>  |  0.69    Ca    9.3      15 Sep 2023 02:45  Phos  2.9     09-15  Mg     1.80     09-15    TPro  8.2  /  Alb  4.0  /  TBili  0.2  /  DBili  x   /  AST  17  /  ALT  22  /  AlkPhos  88  09-14      PT/INR - ( 15 Sep 2023 03:18 )   PT: 12.6 sec;   INR: 1.12 ratio         PTT - ( 15 Sep 2023 03:18 )  PTT:35.2 sec

## 2023-09-15 NOTE — PHYSICAL THERAPY INITIAL EVALUATION ADULT - GENERAL OBSERVATIONS, REHAB EVAL
Pt received sitting in bedside chair in NAD, all lines intact + telemetry, davenport, IV, HR 89, /88.

## 2023-09-15 NOTE — PROGRESS NOTE ADULT - ASSESSMENT
44yo F s/p hemorrhoidectomy c/b seizure. SICU consulted for neuro checks. Patient is now AOx4 w/o focal deficits.    Neuro:  - Q4 neurochecks  - A&Ox4  - Multimodal pain control w/ tylenol, oxy  - code stroke, CT head (stroke protocol): negative for hemorrhage, infarct  - Keppra 500 BID  - Ativan PRN for seizure  - f/u 24 hour video EEG  - MRI pending    Resp:  - 2L NC, wean as tolerated    CVS:  - HDS  - MAPs > 65    GI:   - s/p hemorrhoidectomy (plug and bandage removed POD1)  - diet: regular  - Bowel reg: miralax & Senna    Renal:  - davenport  - Monitor I&Os and electrolytes  - replete electrolytes as needed     Heme:  - Monitor CBC and coags  - VTE prophylaxis: SQH  - SCD's    ID: sepsis:  - Monitor for clinical evidence of active infection  - Monitor WBC, temperature, and procalcitonin    Endo:   -No active issues     Disposition: SICU

## 2023-09-15 NOTE — PHYSICAL THERAPY INITIAL EVALUATION ADULT - MANUAL MUSCLE TESTING RESULTS, REHAB EVAL
Problem: OXYGENATION/RESPIRATORY FUNCTION  Goal: Patient will maintain patent airway  12/15/2021 0808 by Maicol Kerr RN  Outcome: Ongoing     Problem: OXYGENATION/RESPIRATORY FUNCTION  Goal: Patient will achieve/maintain normal respiratory rate/effort  Description: Respiratory rate and effort will be within normal limits for the patient  12/15/2021 0808 by Maicol Kerr RN  Outcome: Ongoing     Problem: HEMODYNAMIC STATUS  Goal: Patient has stable vital signs and fluid balance  12/15/2021 0808 by Maicol Kerr RN  Outcome: Ongoing     Problem: FLUID AND ELECTROLYTE IMBALANCE  Goal: Fluid and electrolyte balance are achieved/maintained  12/15/2021 0808 by Maicol Kerr RN  Outcome: Ongoing     Problem: ACTIVITY INTOLERANCE/IMPAIRED MOBILITY  Goal: Mobility/activity is maintained at optimum level for patient  12/15/2021 0808 by Maicol Kerr RN  Outcome: Ongoing  Maintaining low sodium diet, strict intake and output, daily weights and fluid restriction. Providing at least 15 minutes of CHF education per shift. Assessing vitals q4 hours. Assessing heart and lung sounds. Monitoring labs q shift and as resulted. Maintaining oxygenation. Administering medications as ordered. Reporting abnormal findings to MD.     Problem: Skin Integrity:  Goal: Will show no infection signs and symptoms  Description: Will show no infection signs and symptoms  12/15/2021 0808 by Maicol Kerr RN  Outcome: Ongoing     Problem: Skin Integrity:  Goal: Absence of new skin breakdown  Description: Absence of new skin breakdown  12/15/2021 0808 by Maicol Kerr RN  Outcome: Ongoing  Assessing ramon scale Q shift. Performing skin assessments every shift. Maintaining dry and clean skin. Promoting adequate nutritional intake. Heels elevated off bed. Performing skin care q shift. Utilizing lift equipment when indicated.      Problem: Falls - Risk of:  Goal: Will remain free from falls  Description: Will remain free from falls  12/15/2021 0808 by Mia Chowdhury RN  Outcome: Ongoing     Problem: Falls - Risk of:  Goal: Absence of physical injury  Description: Absence of physical injury  12/15/2021 0808 by Mia Chowdhury RN  Outcome: Ongoing  Bed alarm on, bed in lowest position, wheels locked. Fall risk assessment completed every shift. Nonskid socks on, fall sign posted. Pt educated on use of call light. Problem: Pain:  Goal: Pain level will decrease  Description: Pain level will decrease  12/15/2021 0808 by Mia Chowdhury RN  Outcome: Ongoing     Problem: Pain:  Goal: Control of acute pain  Description: Control of acute pain  12/15/2021 0808 by Mia Chowdhury RN  Outcome: Ongoing     Problem: Pain:  Goal: Control of chronic pain  Description: Control of chronic pain  12/15/2021 0808 by Mia Chowdhury RN  Outcome: Ongoing  Assessing pain using appropriate pain rating scale q shift and PRN. Medicating pt as prescribed and indicated. Offering pt non-pharmacologic pain interventions. Reassessing pain and pts response to pain intervention. Bilateral LE and UE grossly 3+/5

## 2023-09-15 NOTE — PHYSICAL THERAPY INITIAL EVALUATION ADULT - ADDITIONAL COMMENTS
Pt lives alone in a house with 10 steps to enter. Pt did not use an assistive device and was independent with ADLs prior. Pt reports no falls in the past 6 months.   Pt left on toilet in room with RENATE Brown in NAD, all lines intact.

## 2023-09-15 NOTE — PHYSICAL THERAPY INITIAL EVALUATION ADULT - GAIT DISTANCE, PT EVAL
x2 reps, distance limited due to dizziness upon standing, returned to sitting in bedside chair /90, symptoms subsided with seated rest, RN made aware/10 feet

## 2023-09-15 NOTE — PHYSICAL THERAPY INITIAL EVALUATION ADULT - PERTINENT HX OF CURRENT PROBLEM, REHAB EVAL
Pt is a 43 year old female with no history of epilepsy s/p elective hemorrhoidectomy for perianal venous thrombosis on 9/14. Post-op course complicated by witnessed generalized tonic-clonic seizure.

## 2023-09-16 LAB
A1C WITH ESTIMATED AVERAGE GLUCOSE RESULT: 4.9 % — SIGNIFICANT CHANGE UP (ref 4–5.6)
ANION GAP SERPL CALC-SCNC: 15 MMOL/L — HIGH (ref 7–14)
BUN SERPL-MCNC: 9 MG/DL — SIGNIFICANT CHANGE UP (ref 7–23)
CALCIUM SERPL-MCNC: 9.1 MG/DL — SIGNIFICANT CHANGE UP (ref 8.4–10.5)
CHLORIDE SERPL-SCNC: 102 MMOL/L — SIGNIFICANT CHANGE UP (ref 98–107)
CHOLEST SERPL-MCNC: 219 MG/DL — HIGH
CK SERPL-CCNC: 221 U/L — HIGH (ref 25–170)
CO2 SERPL-SCNC: 21 MMOL/L — LOW (ref 22–31)
CREAT SERPL-MCNC: 0.77 MG/DL — SIGNIFICANT CHANGE UP (ref 0.5–1.3)
EGFR: 99 ML/MIN/1.73M2 — SIGNIFICANT CHANGE UP
ESTIMATED AVERAGE GLUCOSE: 94 — SIGNIFICANT CHANGE UP
GLUCOSE SERPL-MCNC: 93 MG/DL — SIGNIFICANT CHANGE UP (ref 70–99)
HCT VFR BLD CALC: 38.2 % — SIGNIFICANT CHANGE UP (ref 34.5–45)
HDLC SERPL-MCNC: 65 MG/DL — SIGNIFICANT CHANGE UP
HGB BLD-MCNC: 12.9 G/DL — SIGNIFICANT CHANGE UP (ref 11.5–15.5)
LIPID PNL WITH DIRECT LDL SERPL: 140 MG/DL — HIGH
MAGNESIUM SERPL-MCNC: 1.9 MG/DL — SIGNIFICANT CHANGE UP (ref 1.6–2.6)
MCHC RBC-ENTMCNC: 28.5 PG — SIGNIFICANT CHANGE UP (ref 27–34)
MCHC RBC-ENTMCNC: 33.8 GM/DL — SIGNIFICANT CHANGE UP (ref 32–36)
MCV RBC AUTO: 84.5 FL — SIGNIFICANT CHANGE UP (ref 80–100)
NON HDL CHOLESTEROL: 154 MG/DL — HIGH
NRBC # BLD: 0 /100 WBCS — SIGNIFICANT CHANGE UP (ref 0–0)
NRBC # FLD: 0 K/UL — SIGNIFICANT CHANGE UP (ref 0–0)
PHOSPHATE SERPL-MCNC: 2.8 MG/DL — SIGNIFICANT CHANGE UP (ref 2.5–4.5)
PLATELET # BLD AUTO: 267 K/UL — SIGNIFICANT CHANGE UP (ref 150–400)
POTASSIUM SERPL-MCNC: 3.8 MMOL/L — SIGNIFICANT CHANGE UP (ref 3.5–5.3)
POTASSIUM SERPL-SCNC: 3.8 MMOL/L — SIGNIFICANT CHANGE UP (ref 3.5–5.3)
RBC # BLD: 4.52 M/UL — SIGNIFICANT CHANGE UP (ref 3.8–5.2)
RBC # FLD: 13 % — SIGNIFICANT CHANGE UP (ref 10.3–14.5)
SODIUM SERPL-SCNC: 138 MMOL/L — SIGNIFICANT CHANGE UP (ref 135–145)
TRIGL SERPL-MCNC: 69 MG/DL — SIGNIFICANT CHANGE UP
WBC # BLD: 10.68 K/UL — HIGH (ref 3.8–10.5)
WBC # FLD AUTO: 10.68 K/UL — HIGH (ref 3.8–10.5)

## 2023-09-16 PROCEDURE — 70553 MRI BRAIN STEM W/O & W/DYE: CPT | Mod: 26

## 2023-09-16 PROCEDURE — 95720 EEG PHY/QHP EA INCR W/VEEG: CPT

## 2023-09-16 RX ORDER — POTASSIUM CHLORIDE 20 MEQ
20 PACKET (EA) ORAL
Refills: 0 | Status: COMPLETED | OUTPATIENT
Start: 2023-09-16 | End: 2023-09-16

## 2023-09-16 RX ORDER — ACETAMINOPHEN 500 MG
975 TABLET ORAL EVERY 8 HOURS
Refills: 0 | Status: DISCONTINUED | OUTPATIENT
Start: 2023-09-16 | End: 2023-09-17

## 2023-09-16 RX ORDER — MAGNESIUM SULFATE 500 MG/ML
2 VIAL (ML) INJECTION ONCE
Refills: 0 | Status: COMPLETED | OUTPATIENT
Start: 2023-09-16 | End: 2023-09-16

## 2023-09-16 RX ORDER — SODIUM,POTASSIUM PHOSPHATES 278-250MG
1 POWDER IN PACKET (EA) ORAL ONCE
Refills: 0 | Status: COMPLETED | OUTPATIENT
Start: 2023-09-16 | End: 2023-09-16

## 2023-09-16 RX ADMIN — POLYETHYLENE GLYCOL 3350 17 GRAM(S): 17 POWDER, FOR SOLUTION ORAL at 12:53

## 2023-09-16 RX ADMIN — LEVETIRACETAM 400 MILLIGRAM(S): 250 TABLET, FILM COATED ORAL at 15:40

## 2023-09-16 RX ADMIN — Medication 20 MILLIEQUIVALENT(S): at 15:42

## 2023-09-16 RX ADMIN — OXYCODONE HYDROCHLORIDE 5 MILLIGRAM(S): 5 TABLET ORAL at 22:12

## 2023-09-16 RX ADMIN — Medication 20 MILLIEQUIVALENT(S): at 17:43

## 2023-09-16 RX ADMIN — OXYCODONE HYDROCHLORIDE 5 MILLIGRAM(S): 5 TABLET ORAL at 11:00

## 2023-09-16 RX ADMIN — Medication 975 MILLIGRAM(S): at 22:46

## 2023-09-16 RX ADMIN — LEVETIRACETAM 400 MILLIGRAM(S): 250 TABLET, FILM COATED ORAL at 04:31

## 2023-09-16 RX ADMIN — Medication 25 GRAM(S): at 16:29

## 2023-09-16 RX ADMIN — HEPARIN SODIUM 5000 UNIT(S): 5000 INJECTION INTRAVENOUS; SUBCUTANEOUS at 13:29

## 2023-09-16 RX ADMIN — HEPARIN SODIUM 5000 UNIT(S): 5000 INJECTION INTRAVENOUS; SUBCUTANEOUS at 22:04

## 2023-09-16 RX ADMIN — Medication 1 TABLET(S): at 15:45

## 2023-09-16 RX ADMIN — OXYCODONE HYDROCHLORIDE 5 MILLIGRAM(S): 5 TABLET ORAL at 22:46

## 2023-09-16 RX ADMIN — HEPARIN SODIUM 5000 UNIT(S): 5000 INJECTION INTRAVENOUS; SUBCUTANEOUS at 05:32

## 2023-09-16 RX ADMIN — SENNA PLUS 2 TABLET(S): 8.6 TABLET ORAL at 22:04

## 2023-09-16 RX ADMIN — OXYCODONE HYDROCHLORIDE 5 MILLIGRAM(S): 5 TABLET ORAL at 10:07

## 2023-09-16 RX ADMIN — Medication 975 MILLIGRAM(S): at 23:46

## 2023-09-16 NOTE — PROGRESS NOTE ADULT - ATTENDING COMMENTS
Patient seen this morning fully alert with no residual neurologic deficit.  Appreciate Neurology consultation and recommendation.  MRI of brain ordered and pending.  Tampon removed at bedside with moderate discomfort.  Patient to start sitz baths twice daily  Miralax.
Please resume at least twice daily warm sitz baths.  Awaiting results of MRI and EEG as well as Neuro final recommendation.
I agree with the detailed interval history, physical, and plan, which I have reviewed and edited where appropriate'; also agree with notes/assessment with my team on service.  I have personally examined the patient.  I was physically present for the key portions of the evaluation and management (E/M) service provided.  I reviewed all the pertinent data.  The patient is a critical care patient with life threatening hemodynamic and metabolic instability in SICU.  The SICU team has a constant risk benefit analyzes discussion and coordinating care with the primary team and all consultants.   The patient is in SICU with the chief complaint and diagnosis mentioned in the note.   The plan will be specified in the note.  44yo F s/p hemorrhoidectomy with post-op  seizure in SICU for neuro checks.   Neurology: AO3,  Respiratory: clear  CV: RR  Abdominal: Soft, NT, ND   Extremities: No edema,   Neuro:  - Q4 neurochecks  -tylenol, oxy  - Keppra   Resp:  - 2L NC  CVS:  - MAPs > 65  GI:   - diet: regular  - miralax & Senna  Renal:  - dc davenprot  Heme:  -SQH  - SCD's  ID:   - Monitor WBC  Endo:   -fu glucose    Disposition: SICU

## 2023-09-16 NOTE — PROGRESS NOTE ADULT - ASSESSMENT
43 F PMH no history of epilepsy s/p elective hemorrhoidectomy for perianal venous thrombosis on 9/14. Post-op course complicated by witnessed generalized tonic-clonic seizure. SICU consulted for hemodynamic monitoring.    Plan:  -Appreciate neuro recs f/u MRI  -DVT ppx  -Diet: regular  -Dispo: pending  - Bowel Regimen    A Team Surgery, [id number]

## 2023-09-16 NOTE — PROGRESS NOTE ADULT - SUBJECTIVE AND OBJECTIVE BOX
TEAM A Surgery Daily Progress Note  =====================================================    SUBJECTIVE: Patient seen and examined at bedside on AM rounds. Patient reports that they're feeling well. Tolerating diet, denies nausea, vomiting.  Flatus+/ +BM. Amublating as tolerated. Denies fever, chills.    ALLERGIES:  No Known Allergies      --------------------------------------------------------------------------------------    MEDICATIONS:    Neurologic Medications  levETIRAcetam  IVPB 500 milliGRAM(s) IV Intermittent every 12 hours  oxyCODONE    IR 5 milliGRAM(s) Oral every 6 hours PRN Severe Pain (7 - 10)  oxyCODONE    IR 2.5 milliGRAM(s) Oral every 6 hours PRN Moderate Pain (4 - 6)    Respiratory Medications    Cardiovascular Medications    Gastrointestinal Medications  polyethylene glycol 3350 17 Gram(s) Oral daily  senna 2 Tablet(s) Oral at bedtime    Genitourinary Medications    Hematologic/Oncologic Medications  heparin   Injectable 5000 Unit(s) SubCutaneous every 8 hours  influenza   Vaccine 0.5 milliLiter(s) IntraMuscular once    Antimicrobial/Immunologic Medications    Endocrine/Metabolic Medications    Topical/Other Medications    --------------------------------------------------------------------------------------    VITAL SIGNS:  Vital Signs Last 24 Hrs  T(C): 36.7 (16 Sep 2023 09:04), Max: 36.7 (16 Sep 2023 09:04)  T(F): 98.1 (16 Sep 2023 09:04), Max: 98.1 (16 Sep 2023 09:04)  HR: 63 (16 Sep 2023 09:04) (63 - 97)  BP: 127/80 (16 Sep 2023 09:04) (100/57 - 150/83)  BP(mean): 102 (16 Sep 2023 04:00) (70 - 109)  RR: 18 (16 Sep 2023 09:04) (12 - 20)  SpO2: 100% (16 Sep 2023 09:04) (97% - 100%)    Parameters below as of 16 Sep 2023 09:04  Patient On (Oxygen Delivery Method): room air      --------------------------------------------------------------------------------------    EXAM    General: NAD, resting in bed comfortably.  Cardiac: regular rate, warm and well perfused  Respiratory: Nonlabored respirations, normal cw expansion.  Abdomen: soft, nontender, nondistended.  Extremities: normal strength, FROM, no deformities    --------------------------------------------------------------------------------------    LABS                          12.9   10.68 )-----------( 267      ( 16 Sep 2023 03:30 )             38.2   09-16    138  |  102  |  9   ----------------------------<  93  3.8   |  21<L>  |  0.77    Ca    9.1      16 Sep 2023 03:30  Phos  2.8     09-16  Mg     1.90     09-16    TPro  8.2  /  Alb  4.0  /  TBili  0.2  /  DBili  x   /  AST  17  /  ALT  22  /  AlkPhos  88  09-14      --------------------------------------------------------------------------------------    INS AND OUTS:    I&O's Detail    15 Sep 2023 07:01  -  16 Sep 2023 07:00  --------------------------------------------------------  IN:    IV PiggyBack: 200 mL    Oral Fluid: 900 mL    sodium chloride 0.9%: 150 mL  Total IN: 1250 mL    OUT:    Indwelling Catheter - Urethral (mL): 1665 mL    Voided (mL): 430 mL  Total OUT: 2095 mL    Total NET: -845 mL        -------------------------------------------------------------------------------------- TEAM A Surgery Daily Progress Note  =====================================================    SUBJECTIVE: Patient seen and examined at bedside on AM rounds. Patient reports that they're feeling well. Tolerating diet, denies nausea, vomiting.  Flatus+/ +BM. Amublating as tolerated. Denies fever, chills.    ALLERGIES:  No Known Allergies      --------------------------------------------------------------------------------------    MEDICATIONS:    Neurologic Medications  levETIRAcetam  IVPB 500 milliGRAM(s) IV Intermittent every 12 hours  oxyCODONE    IR 5 milliGRAM(s) Oral every 6 hours PRN Severe Pain (7 - 10)  oxyCODONE    IR 2.5 milliGRAM(s) Oral every 6 hours PRN Moderate Pain (4 - 6)    Respiratory Medications    Cardiovascular Medications    Gastrointestinal Medications  polyethylene glycol 3350 17 Gram(s) Oral daily  senna 2 Tablet(s) Oral at bedtime    Genitourinary Medications    Hematologic/Oncologic Medications  heparin   Injectable 5000 Unit(s) SubCutaneous every 8 hours  influenza   Vaccine 0.5 milliLiter(s) IntraMuscular once    Antimicrobial/Immunologic Medications    Endocrine/Metabolic Medications    Topical/Other Medications    --------------------------------------------------------------------------------------    VITAL SIGNS:  Vital Signs Last 24 Hrs  T(C): 36.7 (16 Sep 2023 09:04), Max: 36.7 (16 Sep 2023 09:04)  T(F): 98.1 (16 Sep 2023 09:04), Max: 98.1 (16 Sep 2023 09:04)  HR: 63 (16 Sep 2023 09:04) (63 - 97)  BP: 127/80 (16 Sep 2023 09:04) (100/57 - 150/83)  BP(mean): 102 (16 Sep 2023 04:00) (70 - 109)  RR: 18 (16 Sep 2023 09:04) (12 - 20)  SpO2: 100% (16 Sep 2023 09:04) (97% - 100%)    Parameters below as of 16 Sep 2023 09:04  Patient On (Oxygen Delivery Method): room air      --------------------------------------------------------------------------------------    EXAM    General: NAD, resting in bed comfortably.  Cardiac: regular rate, warm and well perfused  Respiratory: Nonlabored respirations, normal cw expansion.  Abdomen: soft, nontender, nondistended.  Extremities: normal strength, FROM, no deformities    --------------------------------------------------------------------------------------    LABS                          12.9   10.68 )-----------( 267      ( 16 Sep 2023 03:30 )             38.2   09-16    138  |  102  |  9   ----------------------------<  93  3.8   |  21<L>  |  0.77    Ca    9.1      16 Sep 2023 03:30  Phos  2.8     09-16  Mg     1.90     09-16    TPro  8.2  /  Alb  4.0  /  TBili  0.2  /  DBili  x   /  AST  17  /  ALT  22  /  AlkPhos  88  09-14    < from: CT Angio Brain Stroke Protocol  w/ IV Cont (09.14.23 @ 22:57) >  CTA NECK:  No occlusion, significant hemodynamically significant stenosis   by NASCET criteria or dissection.    CTA HEAD:  No occlusion or proximalstenosis. No evidence of aneurysm.    CT PERFUSION: Motion degraded. No evidence of core infarct or critically   hypoperfused tissue at risk. Area of elevated Tmax in the left parietal   lobe occipital junction felt to be artifactually related to motion.    < end of copied text >    --------------------------------------------------------------------------------------    INS AND OUTS:    I&O's Detail    15 Sep 2023 07:01  -  16 Sep 2023 07:00  --------------------------------------------------------  IN:    IV PiggyBack: 200 mL    Oral Fluid: 900 mL    sodium chloride 0.9%: 150 mL  Total IN: 1250 mL    OUT:    Indwelling Catheter - Urethral (mL): 1665 mL    Voided (mL): 430 mL  Total OUT: 2095 mL    Total NET: -845 mL        --------------------------------------------------------------------------------------

## 2023-09-17 ENCOUNTER — TRANSCRIPTION ENCOUNTER (OUTPATIENT)
Age: 43
End: 2023-09-17

## 2023-09-17 VITALS
OXYGEN SATURATION: 100 % | HEART RATE: 72 BPM | TEMPERATURE: 99 F | SYSTOLIC BLOOD PRESSURE: 120 MMHG | RESPIRATION RATE: 17 BRPM | DIASTOLIC BLOOD PRESSURE: 75 MMHG

## 2023-09-17 LAB
ANION GAP SERPL CALC-SCNC: 11 MMOL/L — SIGNIFICANT CHANGE UP (ref 7–14)
BUN SERPL-MCNC: 8 MG/DL — SIGNIFICANT CHANGE UP (ref 7–23)
CALCIUM SERPL-MCNC: 9.9 MG/DL — SIGNIFICANT CHANGE UP (ref 8.4–10.5)
CHLORIDE SERPL-SCNC: 99 MMOL/L — SIGNIFICANT CHANGE UP (ref 98–107)
CO2 SERPL-SCNC: 25 MMOL/L — SIGNIFICANT CHANGE UP (ref 22–31)
CREAT SERPL-MCNC: 0.74 MG/DL — SIGNIFICANT CHANGE UP (ref 0.5–1.3)
EGFR: 104 ML/MIN/1.73M2 — SIGNIFICANT CHANGE UP
GLUCOSE SERPL-MCNC: 84 MG/DL — SIGNIFICANT CHANGE UP (ref 70–99)
HCT VFR BLD CALC: 41.6 % — SIGNIFICANT CHANGE UP (ref 34.5–45)
HGB BLD-MCNC: 13.6 G/DL — SIGNIFICANT CHANGE UP (ref 11.5–15.5)
MAGNESIUM SERPL-MCNC: 2.1 MG/DL — SIGNIFICANT CHANGE UP (ref 1.6–2.6)
MCHC RBC-ENTMCNC: 28 PG — SIGNIFICANT CHANGE UP (ref 27–34)
MCHC RBC-ENTMCNC: 32.7 GM/DL — SIGNIFICANT CHANGE UP (ref 32–36)
MCV RBC AUTO: 85.8 FL — SIGNIFICANT CHANGE UP (ref 80–100)
NRBC # BLD: 0 /100 WBCS — SIGNIFICANT CHANGE UP (ref 0–0)
NRBC # FLD: 0 K/UL — SIGNIFICANT CHANGE UP (ref 0–0)
PHOSPHATE SERPL-MCNC: 2.9 MG/DL — SIGNIFICANT CHANGE UP (ref 2.5–4.5)
PLATELET # BLD AUTO: 268 K/UL — SIGNIFICANT CHANGE UP (ref 150–400)
POTASSIUM SERPL-MCNC: 4.3 MMOL/L — SIGNIFICANT CHANGE UP (ref 3.5–5.3)
POTASSIUM SERPL-SCNC: 4.3 MMOL/L — SIGNIFICANT CHANGE UP (ref 3.5–5.3)
RBC # BLD: 4.85 M/UL — SIGNIFICANT CHANGE UP (ref 3.8–5.2)
RBC # FLD: 13.2 % — SIGNIFICANT CHANGE UP (ref 10.3–14.5)
SODIUM SERPL-SCNC: 135 MMOL/L — SIGNIFICANT CHANGE UP (ref 135–145)
WBC # BLD: 11.95 K/UL — HIGH (ref 3.8–10.5)
WBC # FLD AUTO: 11.95 K/UL — HIGH (ref 3.8–10.5)

## 2023-09-17 RX ORDER — POLYETHYLENE GLYCOL 3350 17 G/17G
17 POWDER, FOR SOLUTION ORAL
Qty: 0 | Refills: 0 | DISCHARGE
Start: 2023-09-17

## 2023-09-17 RX ORDER — ACETAMINOPHEN 500 MG
3 TABLET ORAL
Qty: 0 | Refills: 0 | DISCHARGE
Start: 2023-09-17

## 2023-09-17 RX ORDER — LEVETIRACETAM 250 MG/1
1 TABLET, FILM COATED ORAL
Qty: 60 | Refills: 0
Start: 2023-09-17

## 2023-09-17 RX ORDER — SENNA PLUS 8.6 MG/1
2 TABLET ORAL
Qty: 0 | Refills: 0 | DISCHARGE
Start: 2023-09-17

## 2023-09-17 RX ADMIN — HEPARIN SODIUM 5000 UNIT(S): 5000 INJECTION INTRAVENOUS; SUBCUTANEOUS at 06:51

## 2023-09-17 RX ADMIN — LEVETIRACETAM 400 MILLIGRAM(S): 250 TABLET, FILM COATED ORAL at 06:52

## 2023-09-17 RX ADMIN — POLYETHYLENE GLYCOL 3350 17 GRAM(S): 17 POWDER, FOR SOLUTION ORAL at 12:13

## 2023-09-17 NOTE — EEG REPORT - NS EEG TEXT BOX
REPORT OF CONTINUOUS VIDEO EEG      [facility]: [address], [place] [address], Phone: [phone]  [facility]: [address] [place] [address], [place] [address], Phone: [phone]  [facility]: [address] [place] [address], Phone: [phone]    Patient Name: ELI MEDELLIN    Age: 43 year, : 1980  MRN #: -, Kerr: [id number]-  Referring Physician: -  EEG #: [id number]-    Study Date: 2023   Start Time: 2:43 PM      End Date: 2023         End Time: 08:00 AM     Study Duration: 16 HR 30 MN    Study Information:    EEG Recording Technique:  The patient underwent continuous Video-EEG monitoring, using Telemetry System hardware on the XLTek Digital System. EEG and video data were stored on a computer hard drive with important events saved in digital archive files. The material was reviewed by a physician (electroencephalographer / epileptologist) on a daily basis. Tyler and seizure detection algorithms were utilized and reviewed. An EEG Technician attended to the patient, and was available throughout daytime work hours.  The epilepsy center neurologist was available in person or on call 24-hours per day.    EEG Placement and Labeling of Electrodes:  The EEG was performed utilizing 20 channel referential EEG connections (coronal over temporal over parasagittal montage) using all standard 10-20 electrode placements with EKG, with additional electrodes placed in the inferior temporal region using the modified 10-10 montage electrode placements for elective admissions, or if deemed necessary. Recording was at a sampling rate of 256 samples per second per channel. Time synchronized digital video recording was done simultaneously with EEG recording. A low light infrared camera was used for low light recording.     History:  -43 F PMH no history of epilepsy s/p elective hemorrhoidectomy for perianal venous thrombosis on . Post-op course complicated by witnessed generalized tonic-clonic seizure.    Medication  Senna    Miralax    Oxycodone ir    Keppra (Levetiracetam)    Flu vaccine      Interpretation:    [[[Abbreviation Key:  PDR=alpha rhythm/posterior dominant rhythm. A-P=anterior posterior.  Amplitude: ‘very low’:<20; ‘low’:20-49; ‘medium’:; ‘high’:>150uV.  Persistence for periodic/rhythmic patterns (% of epoch) ‘rare’:<1%; ‘occasional’:1-10%; ‘frequent’:10-50%; ‘abundant’:50-90%; ‘continuous’:>90%.  Persistence for sporadic discharges: ‘rare’:<1/hr; ‘occasional’:1/min-1/hr; ‘frequent’:>1/min; ‘abundant’:>1/10 sec.  RPP=rhythmic and periodic patterns; GRDA=generalized rhythmic delta activity; FIRDA=frontal intermittent GRDA; LRDA=lateralized rhythmic delta activity; TIRDA=temporal intermittent rhythmic delta activity;  LPD=PLED=lateralized periodic discharges; GPD=generalized periodic discharges; BIPDs =bilateral independent periodic discharges; Mf=multifocal; SIRPDs=stimulus induced rhythmic, periodic, or ictal appearing discharges; BIRDs=brief potentially ictal rhythmic discharges >4 Hz, lasting .5-10s; PFA (paroxysmal bursts >13 Hz or =8 Hz <10s).  Modifiers: +F=with fast component; +S=with spike component; +R=with rhythmic component.  S-B=burst suppression pattern.  Max=maximal. N1-drowsy; N2-stage II sleep; N3-slow wave sleep. SSS/BETS=small sharp spikes/benign epileptiform transients of sleep. HV=hyperventilation; PS=photic stimulation]]]    Daily EEG Visual Analysis    FINDINGS:      Background:  Symmetry: symmetric  Continuous: continuous  PDR: symmetric, well-modulated 8.5 Hz activity, with amplitude to 40 uV, that attenuated to eye opening.  Low amplitude frontal beta noted in wakefulness.  Reactivity: present  Voltage: normal, mostly 20-150uV  Anterior Posterior Gradient: present  Breach: absent    Background Slowing:  Generalized slowing: none was present.  Focal slowing: none was present.    State Changes:   -Drowsiness was characterized by fragmentation, attenuation, and slowing of the background activity.    -N2 sleep transients with symmetric spindles and K-complexes.    Sporadic Epileptiform Discharges:   None    Rhythmic and Periodic Patterns (RPPs):  None     Electrographic and Electroclinical seizures:  None    Other Clinical Events:  None    Activation Procedures:   -Hyperventilation was performed and did not elicit any abnormalities.    -Photic stimulation was performed and did not elicit any abnormalities.      Artifacts:  Intermittent myogenic and movement artifacts were noted.    ECG:  The heart rate on single channel ECG was predominantly between 60-66 BPM baseline.    EEG Summary / Classification:    Normal EEG in the awake / drowsy / asleep states.  - No epileptic discharges recorded.  - No seizures recorded    EEG Impression / Clinical Correlate:  Normal prolonged EEG study.  No seizures x 1 day(s)    In absence of additional clinical concerns, recommend consideration for discontinuation of current EEG study with reconnection in future if warranted.        ________________________________________    Polly Banda  Fellow, [facility] Epile[facility] [place]    Jodran Anderson MD  Attending Physician, [facility] [place]  		    ------------------------------------  EEG Reading Room: [phone]  On Call Service After Hours: [phone]      REPORT OF CONTINUOUS VIDEO EEG      [facility]: [address], [place] [address], Phone: [phone]  [facility]: [address] [place] [address], [place] [address], Phone: [phone]  [facility]: [address] [place] [address], Phone: [phone]    Patient Name: ELI HOUSTON    Age: 43 year, : 1980  MRN #: -, Kerr: [id number]-  Referring Physician: -  EEG #: 23-    Study Date: 2023   Start Time: 2:43 PM      End Date: 2023         End Time: 12:00 AM     Study Duration: 20 HR 30 MN    Study Information:    EEG Recording Technique:  The patient underwent continuous Video-EEG monitoring, using Telemetry System hardware on the XLTek Digital System. EEG and video data were stored on a computer hard drive with important events saved in digital archive files. The material was reviewed by a physician (electroencephalographer / epileptologist) on a daily basis. Tyler and seizure detection algorithms were utilized and reviewed. An EEG Technician attended to the patient, and was available throughout daytime work hours.  The epilepsy center neurologist was available in person or on call 24-hours per day.    EEG Placement and Labeling of Electrodes:  The EEG was performed utilizing 20 channel referential EEG connections (coronal over temporal over parasagittal montage) using all standard 10-20 electrode placements with EKG, with additional electrodes placed in the inferior temporal region using the modified 10-10 montage electrode placements for elective admissions, or if deemed necessary. Recording was at a sampling rate of 256 samples per second per channel. Time synchronized digital video recording was done simultaneously with EEG recording. A low light infrared camera was used for low light recording.     History:  -43 F PMH no history of epilepsy s/p elective hemorrhoidectomy for perianal venous thrombosis on . Post-op course complicated by witnessed generalized tonic-clonic seizure.    Medication  Senna    Miralax    Oxycodone ir    Keppra (Levetiracetam)    Flu vaccine      Interpretation:    [[[Abbreviation Key:  PDR=alpha rhythm/posterior dominant rhythm. A-P=anterior posterior.  Amplitude: ‘very low’:<20; ‘low’:20-49; ‘medium’:; ‘high’:>150uV.  Persistence for periodic/rhythmic patterns (% of epoch) ‘rare’:<1%; ‘occasional’:1-10%; ‘frequent’:10-50%; ‘abundant’:50-90%; ‘continuous’:>90%.  Persistence for sporadic discharges: ‘rare’:<1/hr; ‘occasional’:1/min-1/hr; ‘frequent’:>1/min; ‘abundant’:>1/10 sec.  RPP=rhythmic and periodic patterns; GRDA=generalized rhythmic delta activity; FIRDA=frontal intermittent GRDA; LRDA=lateralized rhythmic delta activity; TIRDA=temporal intermittent rhythmic delta activity;  LPD=PLED=lateralized periodic discharges; GPD=generalized periodic discharges; BIPDs =bilateral independent periodic discharges; Mf=multifocal; SIRPDs=stimulus induced rhythmic, periodic, or ictal appearing discharges; BIRDs=brief potentially ictal rhythmic discharges >4 Hz, lasting .5-10s; PFA (paroxysmal bursts >13 Hz or =8 Hz <10s).  Modifiers: +F=with fast component; +S=with spike component; +R=with rhythmic component.  S-B=burst suppression pattern.  Max=maximal. N1-drowsy; N2-stage II sleep; N3-slow wave sleep. SSS/BETS=small sharp spikes/benign epileptiform transients of sleep. HV=hyperventilation; PS=photic stimulation]]]    Daily EEG Visual Analysis    FINDINGS:      Background:  Symmetry: symmetric  Continuous: continuous  PDR: symmetric, well-modulated 8.5 Hz activity, with amplitude to 40 uV, that attenuated to eye opening.  Low amplitude frontal beta noted in wakefulness.  Reactivity: present  Voltage: normal, mostly 20-150uV  Anterior Posterior Gradient: present  Breach: absent    Background Slowing:  Generalized slowing: none was present.  Focal slowing: none was present.    State Changes:   -Drowsiness was characterized by fragmentation, attenuation, and slowing of the background activity.    -N2 sleep transients with symmetric spindles and K-complexes.    Sporadic Epileptiform Discharges:   None    Rhythmic and Periodic Patterns (RPPs):  None     Electrographic and Electroclinical seizures:  None    Other Clinical Events:  None    Activation Procedures:   -Hyperventilation was performed and did not elicit any abnormalities.    -Photic stimulation was performed and did not elicit any abnormalities.      Artifacts:  Intermittent myogenic and movement artifacts were noted.    ECG:  The heart rate on single channel ECG was predominantly between 60-66 BPM baseline.    EEG Summary / Classification:    Normal EEG in the awake / drowsy / asleep states.  - No epileptic discharges recorded.  - No seizures recorded    EEG Impression / Clinical Correlate:  Normal prolonged EEG study.  No seizures x 1 day(s)    In absence of additional clinical concerns, recommend consideration for discontinuation of current EEG study with reconnection in future if warranted.        ________________________________________    Polly Banda  Fellow, [facility]ile[facility] [place]    Jordan Anderson MD  Attending Physician, [facility] [place]  		    ------------------------------------  EEG Reading Room: [phone]  On Call Service After Hours: [phone]

## 2023-09-17 NOTE — DISCHARGE NOTE NURSING/CASE MANAGEMENT/SOCIAL WORK - NSDCPEFALRISK_GEN_ALL_CORE
For information on Fall & Injury Prevention, visit: [email or web address] OR  [email or web address] OR  https://www.cdc.gov/steadi/patient.html

## 2023-09-17 NOTE — PROGRESS NOTE ADULT - SUBJECTIVE AND OBJECTIVE BOX
A Team (General Surgery) Daily Progress Note    SUBJECTIVE:  Pt seen and examined, and is resting comfortably in bed. No acute events overnight. Pain is adequately controlled on current regimen. Pt has no complaints at this time. Positive for flatus and BM. Reports difficulty and pain with passing BM.    OBJECTIVE:  Vital Signs Last 24 Hrs  T(C): 37.4 (17 Sep 2023 09:58), Max: 37.4 (17 Sep 2023 09:58)  T(F): 99.4 (17 Sep 2023 09:58), Max: 99.4 (17 Sep 2023 09:58)  HR: 73 (17 Sep 2023 09:58) (64 - 79)  BP: 138/88 (17 Sep 2023 09:58) (121/75 - 145/80)  BP(mean): --  RR: 18 (17 Sep 2023 09:58) (16 - 18)  SpO2: 100% (17 Sep 2023 09:58) (100% - 100%)    Parameters below as of 17 Sep 2023 09:58  Patient On (Oxygen Delivery Method): room air        I&O's Detail    17 Sep 2023 07:01  -  17 Sep 2023 10:53  --------------------------------------------------------  IN:  Total IN: 0 mL    OUT:    Voided (mL): 300 mL  Total OUT: 300 mL    Total NET: -300 mL        Exam:  GEN: A&Ox3, NAD  HEENT: atraumatic, normocephalic  CV: no JVD  RESP: no increased work of breathing, no use of accessory muscles  GI/ABD: soft, nontender, nondistended, no rebound or guarding  EXTREMITIES: warm, pink, well-perfused                        13.6   11.95 )-----------( 268      ( 17 Sep 2023 07:30 )             41.6       09-17    135  |  99  |  8   ----------------------------<  84  4.3   |  25  |  0.74    Ca    9.9      17 Sep 2023 07:30  Phos  2.9     09-17  Mg     2.10     09-17

## 2023-09-17 NOTE — DISCHARGE NOTE NURSING/CASE MANAGEMENT/SOCIAL WORK - NSDCPNINST_GEN_ALL_CORE
Call MD with any signs of infection ie. fever/shaking chills, cloudy foul-smelling urine, or with signs of bleeding, or persistent nausea/ vomiting or diarrhea ,.Continue to drink plenty of fluids and avoid straining as well as constipaton. Follow-up with MD in office for post-op check as well as with PMD as advised for continuity of care.

## 2023-09-17 NOTE — DISCHARGE NOTE PROVIDER - HOSPITAL COURSE
3 F PMH no history of epilepsy s/p elective hemorrhoidectomy for perianal venous thrombosis on 9/14. Post-op course complicated by witnessed generalized tonic-clonic seizure. Pt unresponsive, hemodynamically stable. Pt received Ativan 2mg x1, Propofol 20mg x1, and D50 amp x1 given. Pt became minimally verbally responsive 2-3 min later. Neurology consulted and STAT labs were drawn. Pt transferred to PACU. Pt AO3, post-ictal at bedside. Denies fever, chills, shortness of breath, nausea, vomiting, chest pain. SICU consulted for hemodynamic monitoring. She was started on Keppra and sitz baths BID. Patient was found to be neurologically and hemodynamically stable and was transferred back [facility] floor. Patient's head MRI and EEG were unremarkable.     At the time of discharge, the patient was hemodynamically stable, was tolerating PO diet, was voiding urine and passing stool, was ambulating, and was comfortable with adequate pain control. The patient was instructed to follow up with Dr. Luis Daniel Anderson within 1-2 weeks after discharge from the hospital for the hemorrhoidectomy. The patient was also instructed to followup with Dr. Laura Gray Schoenberg for the seizure. The patient felt comfortable with discharge. The patient was discharged to home/rehab. The patient had no other issues.      .at

## 2023-09-17 NOTE — PROGRESS NOTE ADULT - ASSESSMENT
Assessment:   43 F PMH no history of epilepsy s/p elective hemorrhoidectomy for perianal venous thrombosis on 9/14. Post-op course complicated by witnessed generalized tonic-clonic seizure. SICU consulted for hemodynamic monitoring.    Plan:  -EEG complete- normal; f/u neuro final recs  -DVT ppx: HSQ  -Diet: regular  -Sitz bath BID  -Bowel Regimen: senna and miralax  -Pain control: oxy and tylenol  -Dispo: potentially later today    A Team Surgery, [id number]

## 2023-09-17 NOTE — DISCHARGE NOTE PROVIDER - NSDCMRMEDTOKEN_GEN_ALL_CORE_FT
acetaminophen 325 mg oral tablet: 3 tab(s) orally every 8 hours As needed mild pain (1 - 3 )  Keppra 500 mg oral tablet: 1 tab(s) orally 2 times a day  polyethylene glycol 3350 oral powder for reconstitution: 17 gram(s) orally once a day  senna leaf extract oral tablet: 2 tab(s) orally once a day (at bedtime)

## 2023-09-17 NOTE — DISCHARGE NOTE PROVIDER - NSDCCPTREATMENT_GEN_ALL_CORE_FT
PRINCIPAL PROCEDURE  Procedure: Simple hemorrhoidectomy, internal and external  Findings and Treatment:

## 2023-09-17 NOTE — CHART NOTE - NSCHARTNOTEFT_GEN_A_CORE
Called by [facility] [place] to bedside for patient seizing. Patient is s/p hemorrhoidectomy with no known seizure disorder and only medical history of HLD. Witnessed tonic clonic convulsions. Patient was unresponsive, hypertensive and tachycardic. Per nursing, patient started seizing 1min prior. Rest of anesthesia team to the bedside, called surgical team and medical rapid response. Administered 2mg ativan and 20mg propofol IV and seizure activity ceased. Patient VSS. FS 80. Patient was post-ictal for several minutes but was awake and responsive. Will be brought to main PACU for further monitoring of post-anesthesia recovery and admitted to surgery.
EEG reviewed. No epileptiform discharged. No seizures recorded. EEG recoding time 16.5hours    Given new seizure with minimal provocation, recommend continue Levetiracetam 500mg BID and follow up with Dr. Laura Gray Schoenberg. Address [address] [place] [address]: Phone: [phone]
Pt transferred to [address] [place] [address]. Pt signed out to A surgery. All questions answered.
EEG preliminary read (not final) on the initial recording hour(s) = x 5    No seizures recorded.  Mild slowing noted, nonspecific.  Final report to follow tomorrow morning after completion of study.    [facility] EEG Reading Room Ph#: [phone]  Epilepsy Answering Service after 5PM and before 8:30AM: Ph#: [phone]

## 2023-09-17 NOTE — DISCHARGE NOTE PROVIDER - NSDCCPCAREPLAN_GEN_ALL_CORE_FT
PRINCIPAL DISCHARGE DIAGNOSIS  Diagnosis: Hemorrhoids  Assessment and Plan of Treatment: WOUND CARE:   DRESSINGS: leave dressings and rectal tampon in place for 24hrs. If need to have a bowel movement before then, patient may remove the dressings and take out the rectal tampon.  BATHING: Take at least 2 sitz baths per day until follow up with Dr. Anderson. May take showers.  MEDICATION: Take senna or colace once a day everyday until follow up with Dr. Anderson.  ACTIVITY: No heavy lifting or straining. Otherwise, you may return to your usual level of physical activity. If you are taking narcotic pain medication (such as Percocet), do NOT drive a car, operate machinery or make important decisions.  DIET: Return to your usual diet.  NOTIFY YOUR SURGEON IF: You have any bleeding that does not stop, any pus draining from your wound, any fever (over 100.4 F) or chills, persistent nausea/vomiting, persistent diarrhea, or if your pain is not controlled on your discharge pain medications.  FOLLOW-UP:  1. Please call to make a follow-up appointment within one week of discharge   2. Please follow up with your primary care physician in one week regarding your hospitalization.      SECONDARY DISCHARGE DIAGNOSES  Diagnosis: Seizure  Assessment and Plan of Treatment: Continue to take Keppra 500mg BId until follow-up with Dr. Laura Gray Schoenberg.

## 2023-09-17 NOTE — DISCHARGE NOTE PROVIDER - CARE PROVIDER_API CALL
Mauricio Anderson  Surgery  [address], [address]  [place] [address]  Phone: [phone]  Fax: [phone]  Follow Up Time: 2 weeks    Schoenberg, Laura Gray  Neurology  [address] [place] [address]  Phone: [phone]  Fax: [phone]  Follow Up Time: 2 weeks

## 2023-09-17 NOTE — DISCHARGE NOTE PROVIDER - PROVIDER TOKENS
PROVIDER:[TOKEN:[[id number]],FOLLOWUP:[2 weeks]],PROVIDER:[TOKEN:[[id number]],FOLLOWUP:[2 weeks]]

## 2023-09-17 NOTE — DISCHARGE NOTE NURSING/CASE MANAGEMENT/SOCIAL WORK - PATIENT PORTAL LINK FT
You can access the FollowMyHealth Patient Portal offered by [facility] by registering at the following website: [email or web address]. By joining [employer]’s FollowMyHealth portal, you will also be able to view your health information using other applications (apps) compatible with our system.

## 2023-09-21 LAB — SURGICAL PATHOLOGY STUDY: SIGNIFICANT CHANGE UP

## 2024-01-11 ENCOUNTER — APPOINTMENT (OUTPATIENT)
Dept: INTERNAL MEDICINE | Facility: CLINIC | Age: 44
End: 2024-01-11
Payer: COMMERCIAL

## 2024-01-11 VITALS
DIASTOLIC BLOOD PRESSURE: 80 MMHG | WEIGHT: 168 LBS | SYSTOLIC BLOOD PRESSURE: 128 MMHG | RESPIRATION RATE: 17 BRPM | HEART RATE: 63 BPM | HEIGHT: 65 IN | BODY MASS INDEX: 27.99 KG/M2 | OXYGEN SATURATION: 93 %

## 2024-01-11 DIAGNOSIS — J04.0 ACUTE LARYNGITIS: ICD-10-CM

## 2024-01-11 DIAGNOSIS — U07.1 COVID-19: ICD-10-CM

## 2024-01-11 PROBLEM — K64.5 PERIANAL VENOUS THROMBOSIS: Chronic | Status: ACTIVE | Noted: 2023-09-13

## 2024-01-11 PROCEDURE — 99213 OFFICE O/P EST LOW 20 MIN: CPT

## 2024-01-11 PROCEDURE — G2211 COMPLEX E/M VISIT ADD ON: CPT

## 2024-01-11 RX ORDER — AZITHROMYCIN 250 MG/1
250 TABLET, FILM COATED ORAL
Qty: 1 | Refills: 1 | Status: ACTIVE | COMMUNITY
Start: 2024-01-11 | End: 1900-01-01

## 2024-01-11 NOTE — ASSESSMENT
[FreeTextEntry1] : finish pred 50mg , 3 more pills add zpak to cover for bacterial infection Monitor symptoms and report any changes or concerns.

## 2024-01-11 NOTE — HISTORY OF PRESENT ILLNESS
[FreeTextEntry1] : laryngitis [de-identified] : dxd with covid 1/1/24, lost voice. coughing.  went to urgent care twice,  ended up getting pred 50 and benzonatate

## 2024-01-12 LAB — SARS-COV-2 N GENE NPH QL NAA+PROBE: NOT DETECTED

## 2024-02-07 ENCOUNTER — APPOINTMENT (OUTPATIENT)
Dept: INTERNAL MEDICINE | Facility: CLINIC | Age: 44
End: 2024-02-07
Payer: COMMERCIAL

## 2024-02-07 VITALS
HEART RATE: 71 BPM | BODY MASS INDEX: 27.82 KG/M2 | RESPIRATION RATE: 17 BRPM | DIASTOLIC BLOOD PRESSURE: 70 MMHG | OXYGEN SATURATION: 100 % | HEIGHT: 65 IN | TEMPERATURE: 97.6 F | SYSTOLIC BLOOD PRESSURE: 110 MMHG | WEIGHT: 167 LBS

## 2024-02-07 DIAGNOSIS — B35.1 TINEA UNGUIUM: ICD-10-CM

## 2024-02-07 DIAGNOSIS — M79.89 OTHER SPECIFIED SOFT TISSUE DISORDERS: ICD-10-CM

## 2024-02-07 PROCEDURE — 36415 COLL VENOUS BLD VENIPUNCTURE: CPT

## 2024-02-07 PROCEDURE — 99214 OFFICE O/P EST MOD 30 MIN: CPT | Mod: 25

## 2024-02-15 LAB
ALBUMIN SERPL ELPH-MCNC: 4 G/DL
ALP BLD-CCNC: 88 U/L
ALT SERPL-CCNC: 17 U/L
ANION GAP SERPL CALC-SCNC: 9 MMOL/L
AST SERPL-CCNC: 15 U/L
BASOPHILS # BLD AUTO: 0.02 K/UL
BASOPHILS NFR BLD AUTO: 0.4 %
BILIRUB SERPL-MCNC: 0.4 MG/DL
BUN SERPL-MCNC: 8 MG/DL
CALCIUM SERPL-MCNC: 9.4 MG/DL
CHLORIDE SERPL-SCNC: 105 MMOL/L
CO2 SERPL-SCNC: 26 MMOL/L
CREAT SERPL-MCNC: 0.8 MG/DL
EGFR: 94 ML/MIN/1.73M2
EOSINOPHIL # BLD AUTO: 0.08 K/UL
EOSINOPHIL NFR BLD AUTO: 1.5 %
GLUCOSE SERPL-MCNC: 93 MG/DL
HCT VFR BLD CALC: 38.9 %
HGB BLD-MCNC: 12.4 G/DL
IMM GRANULOCYTES NFR BLD AUTO: 0 %
LYMPHOCYTES # BLD AUTO: 2.16 K/UL
LYMPHOCYTES NFR BLD AUTO: 40.4 %
MAN DIFF?: NORMAL
MCHC RBC-ENTMCNC: 27.4 PG
MCHC RBC-ENTMCNC: 31.9 GM/DL
MCV RBC AUTO: 85.9 FL
MONOCYTES # BLD AUTO: 0.5 K/UL
MONOCYTES NFR BLD AUTO: 9.3 %
NEUTROPHILS # BLD AUTO: 2.59 K/UL
NEUTROPHILS NFR BLD AUTO: 48.4 %
PLATELET # BLD AUTO: 281 K/UL
POTASSIUM SERPL-SCNC: 4.3 MMOL/L
PROT SERPL-MCNC: 7.4 G/DL
RBC # BLD: 4.53 M/UL
RBC # FLD: 13.5 %
SODIUM SERPL-SCNC: 140 MMOL/L
WBC # FLD AUTO: 5.35 K/UL

## 2024-02-15 RX ORDER — TERBINAFINE HYDROCHLORIDE 250 MG/1
250 TABLET ORAL DAILY
Qty: 90 | Refills: 1 | Status: ACTIVE | COMMUNITY
Start: 2024-02-15 | End: 1900-01-01

## 2024-02-28 PROBLEM — M79.89 LEG SWELLING: Status: ACTIVE | Noted: 2024-02-07

## 2024-02-28 PROBLEM — B35.1 ONYCHOMYCOSIS: Status: ACTIVE | Noted: 2024-02-15

## 2024-02-28 NOTE — HISTORY OF PRESENT ILLNESS
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 She has a secondary complaint onychomycosis to her toe nails. She states she is otherwise well and voices no further complaints. ROS as documented below.

## 2024-02-28 NOTE — END OF VISIT
[FreeTextEntry4] : I Jenaro Da Silva am scribing for and in the presence of Dr. Frantz Way, the following sections: HISTORY OF PRESENT ILLNESS, PAST MEDICAL/FAMILY/SOCIAL HISTORY, REVIEW OF SYSTEMS, PHYSICAL EXAM; DISPOSITION.  I personally performed the services described in the documentation, reviewed the documentation recorded by the scribe in my presence and it accurately and completely records my words and actions.

## 2024-04-19 NOTE — ASU PREOP CHECKLIST - SPO2 (%)
Following with gyn. Getting depo shots. Notes significant improvement since being on birth control.    99

## 2024-06-04 NOTE — H&P PST ADULT - NS ABD PE RECTAL EXAM
HISTORY OF PRESENT ILLNESS  We had the pleasure of seeing Nrom Lino today, 6/3/2024, in evaluation of his proteinuria and decreased glomerular filtration rate.  He is, as you know a 77-year-old gentleman with a past medical history of type 2 diabetes mellitus, diet-controlled, and without known diabetic retinopathy, peripheral vascular disease, as well as history of hypertension on lisinopril.  He has a history of BPH and ureteral stricture, who is status post TURP. He was noted to have rise in his serum creatinine from 1.36 on September 18, 2023 to its more current 1.54 here on May 7, 2024 with a random urine showing 35.4 mcg of albumin per mg creatinine on December 18, 2023.  Thus he comes to us today in evaluation of his declining proteinuric chronic kidney disease.  He denies increased ankle and feet swelling.  He has no urinary tract complaints except for nocturia 2 times per night.    He also gives a history of intracranial bleed, obstructive sleep apnea, anxiety, PTSD, GERD, lumbar facet arthropathy, carpal tunnel, and anemia.  He quit smoking in 1968.  He has 4 glasses of wine per week.  He has one child.    PAST MEDICAL HISTORY  Past Medical History:   Diagnosis Date    Anxiety     Atrial fibrillation  (CMD)     s/p cardioversion 11/2011    BPH     Chronic kidney disease     syage 2    Chronic pain     Depression     Diabetes mellitus, type 2  (CMD)     Dysphagia     Encounter for therapeutic drug level monitoring 8/7/2013    Gastroesophageal reflux disease     High cholesterol     Hypertension     Intracranial bleed  (CMD) 01/12/2017    Lumbar facet arthropathy 10/29/2019    Lumbar spondylosis 10/29/2019    TAMMI (obstructive sleep apnea)     on CPAP    PTSD (post-traumatic stress disorder) 02/2017    PVD (peripheral vascular disease) (CMD)     Retrolisthesis 10/29/2019    Spondylarthrosis     Spondylitis     Urethral stricture unspecified 2009       SOCIAL HISTORY  Social History     Tobacco Use     Smoking status: Former     Current packs/day: 0.00     Types: Cigarettes     Quit date: 1968     Years since quittin.4    Smokeless tobacco: Never    Tobacco comments:     was a social smoker, smoked on an off   Substance Use Topics    Alcohol use: Not Currently     Alcohol/week: 4.0 standard drinks of alcohol     Types: 4 Glasses of wine per week     Comment: 1 glass of wine nightly 4 to 5 times per week.       FAMILY HISTORY  Family History   Problem Relation Age of Onset    Cancer Mother         esophageal    Stroke Mother     Cancer, Skin Melanoma Sister        MEDICATIONS  Current Outpatient Medications   Medication Sig    pantoprazole (PROTONIX) 20 MG tablet TAKE 2 TABLETS BY MOUTH DAILY 30 MINUTES BEFORE BREAKFAST ON AN EMPTY STOMACH    traMADol (ULTRAM) 50 MG tablet Take 1 tablet by mouth daily as needed for Pain. Do not start before May 15, 2024.    magnesium oxide (MAG-OX) 400 (240 Mg) MG tablet TAKE 1 TABLET BY MOUTH DAILY    escitalopram (LEXAPRO) 20 MG tablet Take 1 tablet by mouth daily.    tamsulosin (FLOMAX) 0.4 MG Cap TAKE 2 CAPSULES BY MOUTH DAILY    ferrous sulfate (FeroSul) 325 (65 FE) MG tablet Take 1 tablet by mouth daily (with breakfast).    lisinopril (ZESTRIL) 40 MG tablet TAKE 1 TABLET BY MOUTH DAILY    traZODone (DESYREL) 50 MG tablet TAKE 2 TABLETS BY MOUTH EVERY NIGHT    rOPINIRole (REQUIP) 1 MG tablet TAKE 1 TABLET BY MOUTH EVERY NIGHT    atorvastatin (LIPITOR) 40 MG tablet TAKE 1 TABLET BY MOUTH DAILY    Multiple Vitamins-Minerals (VITAMIN D3 COMPLETE PO) Take by mouth daily.    acetaminophen (TYLENOL) 500 MG tablet Take 1-2 tablets by mouth every 6 hours as needed for Pain (min to mod pain).    warfarin (COUMADIN) 5 MG tablet TAKE 1 AND 1/2 TABLETS BY MOUTH 5 DAYS A WEEK THEN TAKE 1 TABLET BY MOUTH 2 DAYS A WEEK OR AS DIRECTED    blood glucose test strip Test blood sugar one time daily. Diagnosis: E11.22 N18.31 . Meter: What is covered by insurance    blood glucose lancets  Test blood sugar one time daily. Diagnosis:E11.22  N18.31  . Meter: What is covered by insurance    Blood Glucose Monitoring Suppl w/Device Kit Check your blood sugar daily while fasting.    finasteride (PROSCAR) 5 MG tablet TAKE 1 TABLET BY MOUTH DAILY    albuterol (ProAir HFA) 108 (90 Base) MCG/ACT inhaler Inhale 2 puffs into the lungs every 4 hours as needed for Shortness of Breath or Wheezing.    budesonide-formoterol (SYMBICORT) 80-4.5 MCG/ACT inhaler Inhale 2 puffs into the lungs daily.    ONETOUCH DELICA LANCETS FINE Misc TEST BLOOD SUGAR TWICE DAILY AS DIRECTED.    Blood Glucose Monitoring Suppl (BLOOD GLUCOSE MONITOR SYSTEM) w/Device Kit One Touch Ultra Device    blood glucose (ONE TOUCH ULTRA TEST) test strip Test blood sugar 2 times daily as directed. Diagnosis: Type II DM. Meter: One Touch Ultra     Current Facility-Administered Medications   Medication    testosterone cypionate (DEPO-TESTOSTERONE) 200 MG/ML injection 300 mg       ALLERGIES  Allergies as of 06/03/2024    (No Known Allergies)       REVIEW OF SYSTEMS  Please see history of present illness; otherwise rest of review of systems is negative.     PHYSICAL EXAMINATION      5/23/2024     2:20 PM 5/23/2024     2:25 PM 5/23/2024     2:30 PM 5/23/2024     2:35 PM 5/23/2024     2:40 PM 5/23/2024     2:53 PM 6/3/2024     1:19 PM   Vitals   SYSTOLIC 131 127 132 129 150 152 128   DIASTOLIC 58 61 60 60 67 67 72   Heart Rate 55 55 55 55 61 53 80   Resp 16 16 16 16 16 16 16   Weight kg       85.594 kg   Height       5' 7\"   BMI (Calculated)       29.55   ]    HEENT: Normocephalic, atraumatic.  EOMI. Sclera is nonicteric. No sinus tenderness. Oral mucosa appears moist.   Neck: Supple. Carotids are equal.  No bruits.  No JVD.  Chest: Breath sounds are clear with no crackles present.    Heart: S1, S2.  No S3 or rub.   Abdomen: Bowel sounds are present.  Soft. No masses or tenderness. No hepatosplenomegaly.   Extremities: No clubbing or cyanosis.  He has no  lower extremity edema.  Skin: No rash.   Neurologic: Normomotor non focal.    LABORATORY DATA  Sodium (mmol/L)   Date Value   05/07/2024 141   03/20/2024 139   10/24/2023 139     Potassium (mmol/L)   Date Value   05/07/2024 4.8   03/20/2024 4.4   10/24/2023 4.5     Chloride (mmol/L)   Date Value   05/07/2024 106   03/20/2024 109   10/24/2023 105     Carbon Dioxide (mmol/L)   Date Value   05/07/2024 26   03/20/2024 25   10/24/2023 29     Anion Gap (mmol/L)   Date Value   05/07/2024 14   03/20/2024 9   10/24/2023 10     BUN (mg/dL)   Date Value   05/07/2024 39 (H)   03/20/2024 42 (H)   10/24/2023 32 (H)     Creatinine (mg/dL)   Date Value   05/07/2024 1.54 (H)   03/20/2024 1.77 (H)   10/24/2023 1.43 (H)     GFR Estimate, Non  (no units)   Date Value   05/27/2019 51   04/25/2019 56   04/12/2019 46     GFR Estimate,  (no units)   Date Value   05/27/2019 59   04/25/2019 65   04/12/2019 54     Glucose (mg/dL)   Date Value   05/07/2024 98   03/20/2024 119 (H)   10/24/2023 157 (H)     Calcium (mg/dL)   Date Value   05/07/2024 9.4   03/20/2024 9.5   10/24/2023 9.7     Albumin (g/dL)   Date Value   05/07/2024 3.6   03/20/2024 3.9   09/18/2023 3.5 (L)     Magnesium (mg/dL)   Date Value   03/20/2024 1.8   12/18/2023 1.8   09/02/2023 1.6 (L)     GOT/AST (Units/L)   Date Value   05/07/2024 23   03/20/2024 31   09/18/2023 23     GPT/ALT (Units/L)   Date Value   05/07/2024 38   03/20/2024 40   09/18/2023 48     Alkaline Phosphatase (Units/L)   Date Value   05/07/2024 103   03/20/2024 96   09/18/2023 95     Bilirubin, Total (mg/dL)   Date Value   05/07/2024 0.3   03/20/2024 0.5   09/18/2023 0.3     Lipase (Units/L)   Date Value   09/09/2014 245     CPK (Units/L)   Date Value   09/19/2011 188       WBC (K/mcL)   Date Value   05/07/2024 7.4   03/20/2024 5.8   10/24/2023 6.2     HGB (g/dL)   Date Value   05/07/2024 11.5 (L)   03/20/2024 10.6 (L)   10/24/2023 11.6 (L)     MCV (fl)   Date Value    05/07/2024 96.4   03/20/2024 96.4   10/24/2023 97.0     MCHC (g/dL)   Date Value   05/07/2024 33.4   03/20/2024 33.1   10/24/2023 32.7     RBC (mil/mcL)   Date Value   05/07/2024 3.57 (L)   03/20/2024 3.32 (L)   10/24/2023 3.66 (L)     HCT (%)   Date Value   05/07/2024 34.4 (L)   03/20/2024 32.0 (L)   10/24/2023 35.5 (L)     MCH (pg)   Date Value   05/07/2024 32.2   03/20/2024 31.9   10/24/2023 31.7     RDW-CV (%)   Date Value   05/07/2024 12.1   03/20/2024 13.9   10/24/2023 12.6     Absolute Neutrophils (K/mcL)   Date Value   05/07/2024 5.3   03/20/2024 3.5   10/24/2023 4.5     Absolute Monocytes (K/mcL)   Date Value   05/07/2024 0.4   03/20/2024 0.6   10/24/2023 0.4     Absolute Basophils (K/mcL)   Date Value   05/07/2024 0.0   03/20/2024 0.0   10/24/2023 0.0     Mono, Percent (%)   Date Value   05/07/2024 6   03/20/2024 11   10/24/2023 6     Basophils, Percent (%)   Date Value   05/07/2024 0   03/20/2024 0   10/24/2023 0     Absolute Lymphocytes (K/mcL)   Date Value   05/07/2024 1.5   03/20/2024 1.4   10/24/2023 1.2     Absolute Eosinophils  (K/mcL)   Date Value   05/07/2024 0.2   03/20/2024 0.1   10/24/2023 0.1     Lab Results   Component Value Date    .00 12/18/2023    .00 10/13/2022    UCR 54.90 01/07/2021             IMPRESSION/PLAN  1. Stage 3a chronic kidney disease  (CMD)    2. Anemia, unspecified type    3. Hypertensive kidney disease    4. Proteinuria, unspecified type        Orders Placed This Encounter    US KIDNEY AND DUPLEX BILATERAL    Immunofixation, Urine    Protein Electrophoresis, Urine, Random    Basic Metabolic Panel    CBC with Automated Differential    Ferritin    Iron And total Iron Binding Capacity    Parathyroid Hormone Intact Without Calcium    Phosphorus    Urinalysis with microscopy without culture    Protein/Creatinine Ratio, Urine       Return in 2 months (on 8/3/2024).    Mr. Lino has proteinuric non nephrotic stage 3 chronic kidney disease (G3aA2) with type 2  diabetes mellitus without known diabetic retinopathy, macrovascular disease with peripheral vascular disease, and hypertension on lisinopril.  His microalbuminuria is fairly minimal.  He has anemia, and we would like him to have his iron stores checked. We we would like him to get an ultrasound of the kidneys with duplex studies of his renal vasculature.  We would like to see him back again in 2 months, and we would like him to get the above labs done about a week prior to coming to see us with those results all forwarded to us here.  He should be following a 2000 mg per day oral sodium restriction in addition to his diabetic diet.  We have cautioned him regarding the potential nephrotoxicity of nonsteroidal anti-inflammatories and Barrientos 2 blockers, and we do urgent try and avoid these as much as possible.  He is a very pleasant gentleman. We are grateful for the opportunity to be able to participate in Mr. Lino care with you.  We very much look forward to working with you again in the future.      patient refused

## 2024-11-04 ENCOUNTER — APPOINTMENT (OUTPATIENT)
Dept: OBGYN | Facility: CLINIC | Age: 44
End: 2024-11-04

## 2024-11-04 VITALS
HEIGHT: 65 IN | HEART RATE: 89 BPM | DIASTOLIC BLOOD PRESSURE: 92 MMHG | WEIGHT: 173 LBS | SYSTOLIC BLOOD PRESSURE: 135 MMHG | BODY MASS INDEX: 28.82 KG/M2

## 2024-11-04 DIAGNOSIS — R10.2 PELVIC AND PERINEAL PAIN: ICD-10-CM

## 2024-11-04 DIAGNOSIS — Z01.419 ENCOUNTER FOR GYNECOLOGICAL EXAMINATION (GENERAL) (ROUTINE) W/OUT ABNORMAL FINDINGS: ICD-10-CM

## 2024-11-04 PROCEDURE — 99396 PREV VISIT EST AGE 40-64: CPT

## 2024-11-04 PROCEDURE — 96127 BRIEF EMOTIONAL/BEHAV ASSMT: CPT

## 2024-11-04 PROCEDURE — 99459 PELVIC EXAMINATION: CPT

## 2024-11-05 LAB — HPV HIGH+LOW RISK DNA PNL CVX: NOT DETECTED

## 2024-11-08 LAB — CYTOLOGY CVX/VAG DOC THIN PREP: NORMAL

## 2025-03-11 ENCOUNTER — APPOINTMENT (OUTPATIENT)
Dept: OBGYN | Facility: CLINIC | Age: 45
End: 2025-03-11
Payer: COMMERCIAL

## 2025-03-11 VITALS
WEIGHT: 157 LBS | DIASTOLIC BLOOD PRESSURE: 90 MMHG | BODY MASS INDEX: 26.16 KG/M2 | HEART RATE: 99 BPM | SYSTOLIC BLOOD PRESSURE: 152 MMHG | HEIGHT: 65 IN

## 2025-03-11 DIAGNOSIS — N93.0 POSTCOITAL AND CONTACT BLEEDING: ICD-10-CM

## 2025-03-11 PROCEDURE — 99212 OFFICE O/P EST SF 10 MIN: CPT

## 2025-03-11 PROCEDURE — 99459 PELVIC EXAMINATION: CPT

## 2025-03-13 ENCOUNTER — EMERGENCY (EMERGENCY)
Facility: HOSPITAL | Age: 45
LOS: 1 days | Discharge: HOME | End: 2025-03-13
Attending: EMERGENCY MEDICINE | Admitting: EMERGENCY MEDICINE
Payer: COMMERCIAL

## 2025-03-13 VITALS
HEART RATE: 70 BPM | DIASTOLIC BLOOD PRESSURE: 88 MMHG | WEIGHT: 156.97 LBS | HEIGHT: 67 IN | OXYGEN SATURATION: 100 % | SYSTOLIC BLOOD PRESSURE: 134 MMHG | RESPIRATION RATE: 18 BRPM | TEMPERATURE: 98 F

## 2025-03-13 VITALS
OXYGEN SATURATION: 100 % | HEART RATE: 71 BPM | DIASTOLIC BLOOD PRESSURE: 89 MMHG | SYSTOLIC BLOOD PRESSURE: 140 MMHG | RESPIRATION RATE: 16 BRPM | TEMPERATURE: 98 F

## 2025-03-13 DIAGNOSIS — Z98.890 OTHER SPECIFIED POSTPROCEDURAL STATES: Chronic | ICD-10-CM

## 2025-03-13 DIAGNOSIS — Z98.891 HISTORY OF UTERINE SCAR FROM PREVIOUS SURGERY: Chronic | ICD-10-CM

## 2025-03-13 LAB
ALBUMIN SERPL ELPH-MCNC: 3.9 G/DL — SIGNIFICANT CHANGE UP (ref 3.3–5)
ALP SERPL-CCNC: 71 U/L — SIGNIFICANT CHANGE UP (ref 40–120)
ALT FLD-CCNC: 18 U/L — SIGNIFICANT CHANGE UP (ref 4–33)
ANION GAP SERPL CALC-SCNC: 10 MMOL/L — SIGNIFICANT CHANGE UP (ref 7–14)
AST SERPL-CCNC: 30 U/L — SIGNIFICANT CHANGE UP (ref 4–32)
BASOPHILS # BLD AUTO: 0.02 K/UL — SIGNIFICANT CHANGE UP (ref 0–0.2)
BASOPHILS NFR BLD AUTO: 0.5 % — SIGNIFICANT CHANGE UP (ref 0–2)
BILIRUB SERPL-MCNC: 0.4 MG/DL — SIGNIFICANT CHANGE UP (ref 0.2–1.2)
BUN SERPL-MCNC: 10 MG/DL — SIGNIFICANT CHANGE UP (ref 7–23)
CALCIUM SERPL-MCNC: 9.2 MG/DL — SIGNIFICANT CHANGE UP (ref 8.4–10.5)
CHLORIDE SERPL-SCNC: 100 MMOL/L — SIGNIFICANT CHANGE UP (ref 98–107)
CO2 SERPL-SCNC: 26 MMOL/L — SIGNIFICANT CHANGE UP (ref 22–31)
CREAT SERPL-MCNC: 0.75 MG/DL — SIGNIFICANT CHANGE UP (ref 0.5–1.3)
EGFR: 101 ML/MIN/1.73M2 — SIGNIFICANT CHANGE UP
EGFR: 101 ML/MIN/1.73M2 — SIGNIFICANT CHANGE UP
EOSINOPHIL # BLD AUTO: 0.05 K/UL — SIGNIFICANT CHANGE UP (ref 0–0.5)
EOSINOPHIL NFR BLD AUTO: 1.2 % — SIGNIFICANT CHANGE UP (ref 0–6)
FLUAV AG NPH QL: SIGNIFICANT CHANGE UP
FLUBV AG NPH QL: DETECTED
GLUCOSE SERPL-MCNC: 112 MG/DL — HIGH (ref 70–99)
HCG SERPL-ACNC: <1 MIU/ML — SIGNIFICANT CHANGE UP
HCT VFR BLD CALC: 35.8 % — SIGNIFICANT CHANGE UP (ref 34.5–45)
HGB BLD-MCNC: 11.3 G/DL — LOW (ref 11.5–15.5)
IANC: 2.49 K/UL — SIGNIFICANT CHANGE UP (ref 1.8–7.4)
IMM GRANULOCYTES NFR BLD AUTO: 0.5 % — SIGNIFICANT CHANGE UP (ref 0–0.9)
LYMPHOCYTES # BLD AUTO: 1.09 K/UL — SIGNIFICANT CHANGE UP (ref 1–3.3)
LYMPHOCYTES # BLD AUTO: 26.6 % — SIGNIFICANT CHANGE UP (ref 13–44)
MCHC RBC-ENTMCNC: 25.7 PG — LOW (ref 27–34)
MCHC RBC-ENTMCNC: 31.6 G/DL — LOW (ref 32–36)
MCV RBC AUTO: 81.4 FL — SIGNIFICANT CHANGE UP (ref 80–100)
MONOCYTES # BLD AUTO: 0.43 K/UL — SIGNIFICANT CHANGE UP (ref 0–0.9)
MONOCYTES NFR BLD AUTO: 10.5 % — SIGNIFICANT CHANGE UP (ref 2–14)
NEUTROPHILS # BLD AUTO: 2.49 K/UL — SIGNIFICANT CHANGE UP (ref 1.8–7.4)
NEUTROPHILS NFR BLD AUTO: 60.7 % — SIGNIFICANT CHANGE UP (ref 43–77)
NRBC # BLD AUTO: 0 K/UL — SIGNIFICANT CHANGE UP (ref 0–0)
NRBC # FLD: 0 K/UL — SIGNIFICANT CHANGE UP (ref 0–0)
NRBC BLD AUTO-RTO: 0 /100 WBCS — SIGNIFICANT CHANGE UP (ref 0–0)
PLATELET # BLD AUTO: 313 K/UL — SIGNIFICANT CHANGE UP (ref 150–400)
POTASSIUM SERPL-MCNC: 3.9 MMOL/L — SIGNIFICANT CHANGE UP (ref 3.5–5.3)
POTASSIUM SERPL-SCNC: 3.9 MMOL/L — SIGNIFICANT CHANGE UP (ref 3.5–5.3)
PROT SERPL-MCNC: 7.9 G/DL — SIGNIFICANT CHANGE UP (ref 6–8.3)
RBC # BLD: 4.4 M/UL — SIGNIFICANT CHANGE UP (ref 3.8–5.2)
RBC # FLD: 14.4 % — SIGNIFICANT CHANGE UP (ref 10.3–14.5)
RSV RNA NPH QL NAA+NON-PROBE: SIGNIFICANT CHANGE UP
SARS-COV-2 RNA SPEC QL NAA+PROBE: SIGNIFICANT CHANGE UP
SODIUM SERPL-SCNC: 136 MMOL/L — SIGNIFICANT CHANGE UP (ref 135–145)
TROPONIN T, HIGH SENSITIVITY RESULT: <6 NG/L — SIGNIFICANT CHANGE UP
WBC # BLD: 4.1 K/UL — SIGNIFICANT CHANGE UP (ref 3.8–10.5)
WBC # FLD AUTO: 4.1 K/UL — SIGNIFICANT CHANGE UP (ref 3.8–10.5)

## 2025-03-13 PROCEDURE — 93010 ELECTROCARDIOGRAM REPORT: CPT

## 2025-03-13 PROCEDURE — 99285 EMERGENCY DEPT VISIT HI MDM: CPT

## 2025-03-13 PROCEDURE — 71046 X-RAY EXAM CHEST 2 VIEWS: CPT | Mod: 26

## 2025-03-13 RX ORDER — KETOROLAC TROMETHAMINE 30 MG/ML
15 INJECTION, SOLUTION INTRAMUSCULAR; INTRAVENOUS ONCE
Refills: 0 | Status: DISCONTINUED | OUTPATIENT
Start: 2025-03-13 | End: 2025-03-13

## 2025-03-13 RX ORDER — ACETAMINOPHEN 500 MG/5ML
1000 LIQUID (ML) ORAL ONCE
Refills: 0 | Status: COMPLETED | OUTPATIENT
Start: 2025-03-13 | End: 2025-03-13

## 2025-03-13 RX ORDER — ONDANSETRON HCL/PF 4 MG/2 ML
4 VIAL (ML) INJECTION ONCE
Refills: 0 | Status: COMPLETED | OUTPATIENT
Start: 2025-03-13 | End: 2025-03-13

## 2025-03-13 RX ORDER — ONDANSETRON HCL/PF 4 MG/2 ML
1 VIAL (ML) INJECTION
Qty: 12 | Refills: 0
Start: 2025-03-13 | End: 2025-03-16

## 2025-03-13 RX ADMIN — KETOROLAC TROMETHAMINE 15 MILLIGRAM(S): 30 INJECTION, SOLUTION INTRAMUSCULAR; INTRAVENOUS at 08:54

## 2025-03-13 RX ADMIN — Medication 20 MILLIGRAM(S): at 08:34

## 2025-03-13 RX ADMIN — Medication 1000 MILLILITER(S): at 08:34

## 2025-03-13 RX ADMIN — Medication 4 MILLIGRAM(S): at 08:34

## 2025-03-13 RX ADMIN — Medication 400 MILLIGRAM(S): at 08:54

## 2025-04-03 NOTE — H&P PST ADULT - DOES PATIENT HAVE ADVANCE DIRECTIVE
The [facility] Navigation team has attempted to reach you in order to follow up on an order that was placed by Dr. Hernandez's office. Please give us a call back at [phone] to discuss care coordination and resources.     HCP provided and explained/No

## 2025-04-29 ENCOUNTER — APPOINTMENT (OUTPATIENT)
Dept: OBGYN | Facility: CLINIC | Age: 45
End: 2025-04-29

## 2025-04-29 VITALS
SYSTOLIC BLOOD PRESSURE: 131 MMHG | WEIGHT: 161 LBS | HEIGHT: 65 IN | BODY MASS INDEX: 26.82 KG/M2 | DIASTOLIC BLOOD PRESSURE: 85 MMHG | HEART RATE: 64 BPM

## 2025-04-29 DIAGNOSIS — N93.0 POSTCOITAL AND CONTACT BLEEDING: ICD-10-CM

## 2025-04-29 PROCEDURE — 99459 PELVIC EXAMINATION: CPT

## 2025-04-29 PROCEDURE — 99213 OFFICE O/P EST LOW 20 MIN: CPT

## 2025-05-01 ENCOUNTER — APPOINTMENT (OUTPATIENT)
Dept: INTERNAL MEDICINE | Facility: CLINIC | Age: 45
End: 2025-05-01

## 2025-05-17 NOTE — ASU PATIENT PROFILE, ADULT - AS [facility]
ANTEPARTUM PROGRESS NOTE    Interval Hx   No acute events overnight.    Subjective   Patient states she has had no further bleeding since 0630 yesterday. Noticed dark brown discharge with wiping. Denies contractions or leakage of fluid. Reports active FM.     Objective   PHYSICAL EXAM:  /63 (BP Location: RUE - Right upper extremity, Patient Position: Semi-Davidson's)   Pulse 76   Temp 98.5 °F (36.9 °C) (Oral)   Resp 16   Ht 5' 2\" (1.575 m)   Wt 66.6 kg (146 lb 13.2 oz)   BMI 26.85 kg/m²   BSA 1.68 m²      Temp:  [97.7 °F (36.5 °C)-98.5 °F (36.9 °C)] 98.5 °F (36.9 °C)  Heart Rate:  [] 76  Resp:  [16-18] 16  BP: ()/(54-74) 102/63  Body mass index is 26.85 kg/m².  General: well developed, well nourished. No acute distress.  Resp: Respirations are non-labored. No respiratory distress.  CV: Normal peripheral perfusion.  Abdomen: Soft, gravid, non-tender.  Extremities:  No calf tenderness bilaterally.  Neurological: A&O x3.  Skin: Warm, dry, normal for ethnicity.   Psychiatric: Cooperative.     Presentation: cephalic (5/16)  SVE: fingertip/0/-3/firm (5/16)  SSE: 2 small clots in vault after removal with q-tip able to visualize cervix-no active bleeding.     NST: 115 bpm / moderate / +accels / -decels  [place]: q 9-11 min    Scheduled Medications  Current Facility-Administered Medications   Medication Dose Route Frequency Provider Last Rate Last Admin    sodium chloride 0.9 % injection 2 mL  2 mL Intracatheter 2 times per day Rosalie Crenshaw MD        PRENATAL vitamin & mineral w/ folic acid 1 mg tablet 1 tablet  1 tablet Oral Daily Rosalie Crenshaw MD        betamethasone acet/sod phos (CELESTONE) injection 12 mg  12 mg Intramuscular Q24H Rosalie Crenshaw MD   12 mg at 05/16/25 1341      Current Facility-Administered Medications   Medication Dose Route Frequency Provider Last Rate Last Admin        PRN Medications  Current Facility-Administered Medications   Medication Dose Route Frequency Provider Last Rate  Last Admin    docusate sodium (COLACE) capsule 200 mg  200 mg Oral Nightly PRN Rosalie Crenshaw MD        acetaminophen (TYLENOL) tablet 650 mg  650 mg Oral Q4H PRN Rosalie Crenshaw MD        famotidine (PEPCID) tablet 20 mg  20 mg Oral BID PRN Rosalie Crenshaw MD        sodium chloride 0.9 % injection 10 mL  10 mL Intravenous PRN Rosalie Crenshaw MD            No results found    Assessment/Plan   Farzaneh is a 31 year old  at 35w4d, HD #1, who presented for vaginal bleeding and admitted for observation and BTMZ course.     Vaginal bleeding (rule-out PTL vs. Abruption)  -No active bleeding on admission  -SVE fingertip on admission and contractions irregular on tocometry  -Abruption labs negative  -Anticipate discharge if no further bleeding for 24-48 hours    Fetal Well-Being  -Monitoring: NST BID  -Growth US (25), EFW 2689g, 15%, MVP 5.7cm, post pl, ceph  -BTMZ -  -GBS bacteruria on  tri Ucx    Inpatient care  -Diet: regular  -Activity: as tolerated  -DVT ppx: SCDs in bed, encourage ambulation  -Access: PIV    Attending: Dr. Cosme Terrazas DO, PGY-3  Obstetrics & Gynecology   (4) no limitation

## 2025-05-31 ENCOUNTER — NON-APPOINTMENT (OUTPATIENT)
Age: 45
End: 2025-05-31

## 2025-09-19 ENCOUNTER — NON-APPOINTMENT (OUTPATIENT)
Age: 45
End: 2025-09-19

## (undated) DEVICE — LABELS BLANK W PEN

## (undated) DEVICE — PREP BETADINE SPONGE STICKS

## (undated) DEVICE — PREP BETADINE KIT

## (undated) DEVICE — SOL IRR POUR H2O 250ML

## (undated) DEVICE — GLV 7.5 PROTEXIS (WHITE)

## (undated) DEVICE — POSITIONER PATIENT SAFETY STRAP 3X60"

## (undated) DEVICE — DRSG GAUZE VASELINE PETROLEUM 3 X 9"

## (undated) DEVICE — SUCTION YANKAUER OPEN TIP NO VENT CURVE

## (undated) DEVICE — TAPE SILK 3"

## (undated) DEVICE — LAP PAD W RING 18 X 18"

## (undated) DEVICE — SUT MONOCRYL 4-0 27" PS-2 UNDYED

## (undated) DEVICE — DRAPE LAPAROTOMY TRANSVERSE

## (undated) DEVICE — SOL IRR POUR NS 0.9% 500ML

## (undated) DEVICE — ELCTR BOVIE TIP BLADE INSULATED 2.75" EDGE

## (undated) DEVICE — RADIOGRAPHY DVC SPEC TRANSPEC

## (undated) DEVICE — ANESTHESIA CIRCUIT ADULT HMEF

## (undated) DEVICE — DRSG BENZOIN 0.6CC

## (undated) DEVICE — DRAPE TOWEL BLUE 17" X 24"

## (undated) DEVICE — VENODYNE/SCD SLEEVE CALF MEDIUM

## (undated) DEVICE — WARMING BLANKET LOWER ADULT

## (undated) DEVICE — SOL IRR POUR H2O 500ML

## (undated) DEVICE — PACK MINOR NO DRAPE

## (undated) DEVICE — POSITIONER HEAD REST PRONE

## (undated) DEVICE — ELCTR ROCKER SWITCH PENCIL BLUE 10FT

## (undated) DEVICE — POSITIONER BLUE BOLSTER

## (undated) DEVICE — PROTECTOR HEEL / ELBOW FLUFFY

## (undated) DEVICE — WARMING BLANKET FULL UNDERBODY

## (undated) DEVICE — CANISTER DISPOSABLE THIN WALL 3000CC

## (undated) DEVICE — GOWN LG

## (undated) DEVICE — PACK MINOR WITH LAP

## (undated) DEVICE — SUT CHROMIC 3-0 27" SH

## (undated) DEVICE — POSITIONER FOAM EGG CRATE ULNAR 2PCS (PINK)

## (undated) DEVICE — DRSG CURITY GAUZE SPONGE 4 X 4" 12-PLY NON-STERILE

## (undated) DEVICE — DRSG TEGADERM 4X4.75"

## (undated) DEVICE — DRSG STERISTRIPS 0.5 X 4"

## (undated) DEVICE — ELCTR GROUNDING PAD ADULT COVIDIEN

## (undated) DEVICE — ELCTR BOVIE TIP BLADE INSULATED 4" EDGE

## (undated) DEVICE — GLV 7.5 PROTEXIS (CREAM) MICRO

## (undated) DEVICE — TUBING SUCTION NONCONDUCTIVE 6MM X 12FT

## (undated) DEVICE — NDL HYPO SAFE 25G X 1" (ORANGE)

## (undated) DEVICE — SUT CHROMIC 2-0 27" SH